# Patient Record
Sex: FEMALE | Race: WHITE | NOT HISPANIC OR LATINO
[De-identification: names, ages, dates, MRNs, and addresses within clinical notes are randomized per-mention and may not be internally consistent; named-entity substitution may affect disease eponyms.]

---

## 2020-05-04 PROBLEM — Z00.00 ENCOUNTER FOR PREVENTIVE HEALTH EXAMINATION: Status: ACTIVE | Noted: 2020-05-04

## 2020-05-11 ENCOUNTER — APPOINTMENT (OUTPATIENT)
Dept: OBGYN | Facility: CLINIC | Age: 30
End: 2020-05-11
Payer: COMMERCIAL

## 2020-05-11 VITALS — WEIGHT: 134 LBS | SYSTOLIC BLOOD PRESSURE: 110 MMHG | DIASTOLIC BLOOD PRESSURE: 80 MMHG

## 2020-05-11 DIAGNOSIS — Z87.19 PERSONAL HISTORY OF OTHER DISEASES OF THE DIGESTIVE SYSTEM: ICD-10-CM

## 2020-05-11 PROCEDURE — 36415 COLL VENOUS BLD VENIPUNCTURE: CPT

## 2020-05-11 NOTE — HISTORY OF PRESENT ILLNESS
[Normal Amount/Duration] : was of a normal amount and duration [Definite:  ___ (Date)] : the last menstrual period was [unfilled] [Regular Cycle Intervals] : periods have been regular [Menstrual Cramps] : menstrual cramps [Frequency: Q ___ days] : menstrual periods occur approximately every [unfilled] days [Menarche Age: ____] : age at menarche was [unfilled] [Spotting Between  Menses] : no spotting between menses [On BCP at conception] : the patient was not on BCP at conception [Contraception] : does not use contraception

## 2020-05-11 NOTE — CHIEF COMPLAINT
[Initial Prenatal Visit] : initial prenatal visit [FreeTextEntry1] : Initial prenatal visit.\par IVF\par Transfer Date:3/35/2020\par SHRUTHI:12/11/2020\par [id number]

## 2020-05-13 LAB
ABO + RH PNL BLD: NORMAL
BLD GP AB SCN SERPL QL: NORMAL
HCG SERPL-MCNC: ABNORMAL MIU/ML
PROGEST SERPL-MCNC: 26 NG/ML

## 2020-05-26 ENCOUNTER — TRANSCRIPTION ENCOUNTER (OUTPATIENT)
Age: 30
End: 2020-05-26

## 2020-05-28 ENCOUNTER — APPOINTMENT (OUTPATIENT)
Dept: OBGYN | Facility: CLINIC | Age: 30
End: 2020-05-28
Payer: COMMERCIAL

## 2020-05-28 PROCEDURE — 99212 OFFICE O/P EST SF 10 MIN: CPT

## 2020-08-31 ENCOUNTER — APPOINTMENT (OUTPATIENT)
Dept: OBGYN | Facility: CLINIC | Age: 30
End: 2020-08-31
Payer: COMMERCIAL

## 2020-08-31 VITALS
DIASTOLIC BLOOD PRESSURE: 80 MMHG | BODY MASS INDEX: 22.02 KG/M2 | WEIGHT: 137 LBS | SYSTOLIC BLOOD PRESSURE: 110 MMHG | HEIGHT: 66.14 IN

## 2020-08-31 DIAGNOSIS — O02.1 MISSED ABORTION: ICD-10-CM

## 2020-08-31 PROCEDURE — 0500F INITIAL PRENATAL CARE VISIT: CPT

## 2020-08-31 PROCEDURE — 36415 COLL VENOUS BLD VENIPUNCTURE: CPT

## 2020-09-01 LAB
ABO + RH PNL BLD: NORMAL
BASOPHILS # BLD AUTO: 0.04 K/UL
BASOPHILS NFR BLD AUTO: 0.4 %
BLD GP AB SCN SERPL QL: NORMAL
CMV IGG SERPL QL: >10 U/ML
CMV IGG SERPL-IMP: POSITIVE
CMV IGM SERPL QL: <8 AU/ML
CMV IGM SERPL QL: NEGATIVE
EOSINOPHIL # BLD AUTO: 0.24 K/UL
EOSINOPHIL NFR BLD AUTO: 2.4 %
HBV SURFACE AG SER QL: NONREACTIVE
HCT VFR BLD CALC: 43.3 %
HCV AB SER QL: NONREACTIVE
HCV S/CO RATIO: 0.26 S/CO
HGB BLD-MCNC: 14.1 G/DL
HIV1+2 AB SPEC QL IA.RAPID: NONREACTIVE
HSV 1+2 IGG SER IA-IMP: NEGATIVE
HSV 1+2 IGG SER IA-IMP: POSITIVE
HSV1 IGG SER QL: 0.15 INDEX
HSV2 IGG SER QL: 3.18 INDEX
IMM GRANULOCYTES NFR BLD AUTO: 0.6 %
LYMPHOCYTES # BLD AUTO: 2.51 K/UL
LYMPHOCYTES NFR BLD AUTO: 24.9 %
MAN DIFF?: NORMAL
MCHC RBC-ENTMCNC: 29.6 PG
MCHC RBC-ENTMCNC: 32.6 GM/DL
MCV RBC AUTO: 91 FL
MEV IGG FLD QL IA: 52.1 AU/ML
MEV IGG+IGM SER-IMP: POSITIVE
MONOCYTES # BLD AUTO: 0.58 K/UL
MONOCYTES NFR BLD AUTO: 5.7 %
NEUTROPHILS # BLD AUTO: 6.67 K/UL
NEUTROPHILS NFR BLD AUTO: 66 %
PLATELET # BLD AUTO: 204 K/UL
RBC # BLD: 4.76 M/UL
RBC # FLD: 12.4 %
RUBV IGG FLD-ACNC: 7.3 INDEX
RUBV IGG SER-IMP: POSITIVE
T GONDII AB SER-IMP: NEGATIVE
T GONDII AB SER-IMP: NEGATIVE
T GONDII IGG SER QL: 4.4 IU/ML
T GONDII IGM SER QL: <3 AU/ML
T PALLIDUM AB SER QL IA: NEGATIVE
TSH SERPL-ACNC: 0.29 UIU/ML
VZV AB TITR SER: POSITIVE
VZV IGG SER IF-ACNC: 2185 INDEX
WBC # FLD AUTO: 10.1 K/UL

## 2020-09-02 LAB
BACTERIA UR CULT: NORMAL
C TRACH RRNA SPEC QL NAA+PROBE: NOT DETECTED
N GONORRHOEA RRNA SPEC QL NAA+PROBE: NOT DETECTED
SOURCE AMPLIFICATION: NORMAL

## 2020-09-03 LAB
B19V IGG SER QL IA: 5.3 INDEX
B19V IGG+IGM SER-IMP: NORMAL
B19V IGG+IGM SER-IMP: POSITIVE
B19V IGM FLD-ACNC: 0.1
B19V IGM SER-ACNC: NEGATIVE
HGB A MFR BLD: 97.5 %
HGB A2 MFR BLD: 2.5 %
HGB FRACT BLD-IMP: NORMAL
HSV1 IGM SER QL: NORMAL TITER
HSV2 AB FLD-ACNC: NORMAL TITER
MEV IGM SER QL: NEGATIVE
VZV IGM SER IF-ACNC: <0.91 INDEX

## 2020-09-21 ENCOUNTER — APPOINTMENT (OUTPATIENT)
Dept: OBGYN | Facility: CLINIC | Age: 30
End: 2020-09-21
Payer: COMMERCIAL

## 2020-09-21 VITALS
SYSTOLIC BLOOD PRESSURE: 100 MMHG | BODY MASS INDEX: 26.7 KG/M2 | DIASTOLIC BLOOD PRESSURE: 60 MMHG | HEIGHT: 60 IN | WEIGHT: 136 LBS

## 2020-09-21 PROCEDURE — 0502F SUBSEQUENT PRENATAL CARE: CPT

## 2020-09-21 PROCEDURE — 36415 COLL VENOUS BLD VENIPUNCTURE: CPT

## 2020-10-05 ENCOUNTER — APPOINTMENT (OUTPATIENT)
Dept: ANTEPARTUM | Facility: CLINIC | Age: 30
End: 2020-10-05
Payer: COMMERCIAL

## 2020-10-05 PROCEDURE — 76801 OB US < 14 WKS SINGLE FETUS: CPT

## 2020-10-05 PROCEDURE — 76813 OB US NUCHAL MEAS 1 GEST: CPT

## 2020-10-15 ENCOUNTER — APPOINTMENT (OUTPATIENT)
Dept: OBGYN | Facility: CLINIC | Age: 30
End: 2020-10-15
Payer: COMMERCIAL

## 2020-10-15 VITALS
WEIGHT: 143.63 LBS | SYSTOLIC BLOOD PRESSURE: 100 MMHG | HEIGHT: 60 IN | BODY MASS INDEX: 28.2 KG/M2 | DIASTOLIC BLOOD PRESSURE: 60 MMHG

## 2020-10-15 PROCEDURE — 0502F SUBSEQUENT PRENATAL CARE: CPT

## 2020-11-02 ENCOUNTER — APPOINTMENT (OUTPATIENT)
Dept: ANTEPARTUM | Facility: CLINIC | Age: 30
End: 2020-11-02
Payer: COMMERCIAL

## 2020-11-02 PROCEDURE — 76805 OB US >/= 14 WKS SNGL FETUS: CPT

## 2020-11-02 PROCEDURE — 76817 TRANSVAGINAL US OBSTETRIC: CPT | Mod: 59

## 2020-11-02 PROCEDURE — 99072 ADDL SUPL MATRL&STAF TM PHE: CPT

## 2020-11-05 ENCOUNTER — APPOINTMENT (OUTPATIENT)
Dept: OBGYN | Facility: CLINIC | Age: 30
End: 2020-11-05
Payer: COMMERCIAL

## 2020-11-05 VITALS
SYSTOLIC BLOOD PRESSURE: 100 MMHG | HEIGHT: 60 IN | DIASTOLIC BLOOD PRESSURE: 60 MMHG | WEIGHT: 143 LBS | BODY MASS INDEX: 28.07 KG/M2

## 2020-11-05 PROCEDURE — 0502F SUBSEQUENT PRENATAL CARE: CPT

## 2020-11-09 LAB
1ST TRIMESTER DATA: NORMAL
2ND TRIMESTER DATA: NORMAL
AFP PNL SERPL: NORMAL
AFP SERPL-ACNC: NORMAL
AFP SERPL-ACNC: NORMAL
B-HCG FREE SERPL-MCNC: NORMAL
CLINICAL BIOCHEMIST REVIEW: NORMAL
FREE BETA HCG 1ST TRIMESTER: NORMAL
INHIBIN A SERPL-MCNC: NORMAL
NASAL BONE: PRESENT
NOTES NTD: NORMAL
NT: NORMAL
PAPP-A SERPL-ACNC: NORMAL
U ESTRIOL SERPL-SCNC: NORMAL

## 2020-12-03 ENCOUNTER — APPOINTMENT (OUTPATIENT)
Dept: ANTEPARTUM | Facility: CLINIC | Age: 30
End: 2020-12-03
Payer: COMMERCIAL

## 2020-12-03 PROCEDURE — 99072 ADDL SUPL MATRL&STAF TM PHE: CPT

## 2020-12-03 PROCEDURE — 76805 OB US >/= 14 WKS SNGL FETUS: CPT

## 2020-12-03 PROCEDURE — 76817 TRANSVAGINAL US OBSTETRIC: CPT | Mod: 59

## 2020-12-07 ENCOUNTER — APPOINTMENT (OUTPATIENT)
Dept: ANTEPARTUM | Facility: CLINIC | Age: 30
End: 2020-12-07
Payer: COMMERCIAL

## 2020-12-07 PROCEDURE — 76816 OB US FOLLOW-UP PER FETUS: CPT

## 2020-12-07 PROCEDURE — 99072 ADDL SUPL MATRL&STAF TM PHE: CPT

## 2020-12-08 ENCOUNTER — APPOINTMENT (OUTPATIENT)
Dept: OBGYN | Facility: CLINIC | Age: 30
End: 2020-12-08
Payer: COMMERCIAL

## 2020-12-08 VITALS
DIASTOLIC BLOOD PRESSURE: 60 MMHG | SYSTOLIC BLOOD PRESSURE: 100 MMHG | BODY MASS INDEX: 29.79 KG/M2 | WEIGHT: 151.75 LBS | HEIGHT: 60 IN

## 2020-12-08 PROCEDURE — 0502F SUBSEQUENT PRENATAL CARE: CPT

## 2020-12-29 ENCOUNTER — APPOINTMENT (OUTPATIENT)
Dept: OBGYN | Facility: CLINIC | Age: 30
End: 2020-12-29
Payer: COMMERCIAL

## 2020-12-29 VITALS
WEIGHT: 154 LBS | HEIGHT: 60 IN | BODY MASS INDEX: 30.23 KG/M2 | DIASTOLIC BLOOD PRESSURE: 70 MMHG | SYSTOLIC BLOOD PRESSURE: 120 MMHG

## 2020-12-29 PROCEDURE — 36415 COLL VENOUS BLD VENIPUNCTURE: CPT

## 2020-12-29 PROCEDURE — 0502F SUBSEQUENT PRENATAL CARE: CPT

## 2020-12-30 LAB
BASOPHILS # BLD AUTO: 0.02 K/UL
BASOPHILS NFR BLD AUTO: 0.2 %
EOSINOPHIL # BLD AUTO: 0.04 K/UL
EOSINOPHIL NFR BLD AUTO: 0.4 %
ESTIMATED AVERAGE GLUCOSE: 88 MG/DL
GLUCOSE 1H P 50 G GLC PO SERPL-MCNC: 145 MG/DL
HBA1C MFR BLD HPLC: 4.7 %
HCT VFR BLD CALC: 37.5 %
HGB BLD-MCNC: 12 G/DL
IMM GRANULOCYTES NFR BLD AUTO: 2.7 %
LYMPHOCYTES # BLD AUTO: 1.91 K/UL
LYMPHOCYTES NFR BLD AUTO: 20.4 %
MAN DIFF?: NORMAL
MCHC RBC-ENTMCNC: 30.9 PG
MCHC RBC-ENTMCNC: 32 GM/DL
MCV RBC AUTO: 96.6 FL
MONOCYTES # BLD AUTO: 0.52 K/UL
MONOCYTES NFR BLD AUTO: 5.6 %
NEUTROPHILS # BLD AUTO: 6.62 K/UL
NEUTROPHILS NFR BLD AUTO: 70.7 %
PLATELET # BLD AUTO: 156 K/UL
RBC # BLD: 3.88 M/UL
RBC # FLD: 13.9 %
WBC # FLD AUTO: 9.36 K/UL

## 2020-12-31 LAB — BACTERIA UR CULT: NORMAL

## 2021-01-04 ENCOUNTER — TRANSCRIPTION ENCOUNTER (OUTPATIENT)
Age: 31
End: 2021-01-04

## 2021-01-05 ENCOUNTER — TRANSCRIPTION ENCOUNTER (OUTPATIENT)
Age: 31
End: 2021-01-05

## 2021-01-06 ENCOUNTER — APPOINTMENT (OUTPATIENT)
Dept: OBGYN | Facility: CLINIC | Age: 31
End: 2021-01-06
Payer: COMMERCIAL

## 2021-01-06 ENCOUNTER — NON-APPOINTMENT (OUTPATIENT)
Age: 31
End: 2021-01-06

## 2021-01-06 PROCEDURE — 0502F SUBSEQUENT PRENATAL CARE: CPT

## 2021-01-06 PROCEDURE — 36415 COLL VENOUS BLD VENIPUNCTURE: CPT

## 2021-01-07 LAB
GLUCOSE 1H P 100 G GLC PO SERPL-MCNC: 165 MG/DL
GLUCOSE 2H P CHAL SERPL-MCNC: 152 MG/DL
GLUCOSE 3H P CHAL SERPL-MCNC: 126 MG/DL
GLUCOSE BS SERPL-MCNC: 77 MG/DL

## 2021-01-21 ENCOUNTER — APPOINTMENT (OUTPATIENT)
Dept: ANTEPARTUM | Facility: CLINIC | Age: 31
End: 2021-01-21
Payer: COMMERCIAL

## 2021-01-21 PROCEDURE — 76817 TRANSVAGINAL US OBSTETRIC: CPT | Mod: 59

## 2021-01-21 PROCEDURE — 99072 ADDL SUPL MATRL&STAF TM PHE: CPT

## 2021-01-21 PROCEDURE — 76819 FETAL BIOPHYS PROFIL W/O NST: CPT

## 2021-01-21 PROCEDURE — 76816 OB US FOLLOW-UP PER FETUS: CPT

## 2021-01-28 ENCOUNTER — APPOINTMENT (OUTPATIENT)
Dept: OBGYN | Facility: CLINIC | Age: 31
End: 2021-01-28
Payer: COMMERCIAL

## 2021-01-28 ENCOUNTER — NON-APPOINTMENT (OUTPATIENT)
Age: 31
End: 2021-01-28

## 2021-01-28 ENCOUNTER — APPOINTMENT (OUTPATIENT)
Dept: OBGYN | Facility: CLINIC | Age: 31
End: 2021-01-28

## 2021-01-28 VITALS — BODY MASS INDEX: 30.66 KG/M2 | SYSTOLIC BLOOD PRESSURE: 100 MMHG | DIASTOLIC BLOOD PRESSURE: 70 MMHG | WEIGHT: 157 LBS

## 2021-01-28 PROCEDURE — 0502F SUBSEQUENT PRENATAL CARE: CPT

## 2021-02-10 ENCOUNTER — TRANSCRIPTION ENCOUNTER (OUTPATIENT)
Age: 31
End: 2021-02-10

## 2021-02-18 ENCOUNTER — ASOB RESULT (OUTPATIENT)
Age: 31
End: 2021-02-18

## 2021-02-18 ENCOUNTER — APPOINTMENT (OUTPATIENT)
Dept: ANTEPARTUM | Facility: CLINIC | Age: 31
End: 2021-02-18
Payer: COMMERCIAL

## 2021-02-18 PROCEDURE — 76816 OB US FOLLOW-UP PER FETUS: CPT

## 2021-02-18 PROCEDURE — 76819 FETAL BIOPHYS PROFIL W/O NST: CPT

## 2021-02-18 PROCEDURE — 99072 ADDL SUPL MATRL&STAF TM PHE: CPT

## 2021-02-25 ENCOUNTER — NON-APPOINTMENT (OUTPATIENT)
Age: 31
End: 2021-02-25

## 2021-02-25 ENCOUNTER — APPOINTMENT (OUTPATIENT)
Dept: OBGYN | Facility: CLINIC | Age: 31
End: 2021-02-25
Payer: COMMERCIAL

## 2021-02-25 VITALS
BODY MASS INDEX: 31.61 KG/M2 | WEIGHT: 161 LBS | SYSTOLIC BLOOD PRESSURE: 100 MMHG | DIASTOLIC BLOOD PRESSURE: 60 MMHG | HEIGHT: 60 IN

## 2021-02-25 PROCEDURE — 0502F SUBSEQUENT PRENATAL CARE: CPT

## 2021-03-12 ENCOUNTER — NON-APPOINTMENT (OUTPATIENT)
Age: 31
End: 2021-03-12

## 2021-03-12 ENCOUNTER — APPOINTMENT (OUTPATIENT)
Dept: OBGYN | Facility: CLINIC | Age: 31
End: 2021-03-12
Payer: COMMERCIAL

## 2021-03-12 VITALS — WEIGHT: 165 LBS | BODY MASS INDEX: 32.22 KG/M2 | DIASTOLIC BLOOD PRESSURE: 68 MMHG | SYSTOLIC BLOOD PRESSURE: 106 MMHG

## 2021-03-12 PROCEDURE — 0502F SUBSEQUENT PRENATAL CARE: CPT

## 2021-03-15 LAB
GP B STREP DNA SPEC QL NAA+PROBE: NORMAL
GP B STREP DNA SPEC QL NAA+PROBE: NOT DETECTED
SOURCE GBS: NORMAL

## 2021-03-16 RX ORDER — VALACYCLOVIR 500 MG/1
500 TABLET, FILM COATED ORAL
Qty: 60 | Refills: 1 | Status: ACTIVE | COMMUNITY
Start: 2021-03-16 | End: 1900-01-01

## 2021-03-18 ENCOUNTER — NON-APPOINTMENT (OUTPATIENT)
Age: 31
End: 2021-03-18

## 2021-03-18 ENCOUNTER — APPOINTMENT (OUTPATIENT)
Dept: ANTEPARTUM | Facility: CLINIC | Age: 31
End: 2021-03-18
Payer: COMMERCIAL

## 2021-03-18 ENCOUNTER — ASOB RESULT (OUTPATIENT)
Age: 31
End: 2021-03-18

## 2021-03-18 ENCOUNTER — APPOINTMENT (OUTPATIENT)
Dept: OBGYN | Facility: CLINIC | Age: 31
End: 2021-03-18
Payer: COMMERCIAL

## 2021-03-18 VITALS — BODY MASS INDEX: 32.81 KG/M2 | SYSTOLIC BLOOD PRESSURE: 110 MMHG | WEIGHT: 168 LBS | DIASTOLIC BLOOD PRESSURE: 80 MMHG

## 2021-03-18 PROCEDURE — 76819 FETAL BIOPHYS PROFIL W/O NST: CPT

## 2021-03-18 PROCEDURE — 0502F SUBSEQUENT PRENATAL CARE: CPT

## 2021-03-18 PROCEDURE — 99072 ADDL SUPL MATRL&STAF TM PHE: CPT

## 2021-03-18 PROCEDURE — 76816 OB US FOLLOW-UP PER FETUS: CPT

## 2021-03-18 RX ORDER — VALACYCLOVIR 500 MG/1
500 TABLET, FILM COATED ORAL
Qty: 60 | Refills: 2 | Status: ACTIVE | COMMUNITY
Start: 2021-03-18 | End: 1900-01-01

## 2021-03-26 ENCOUNTER — NON-APPOINTMENT (OUTPATIENT)
Age: 31
End: 2021-03-26

## 2021-03-26 ENCOUNTER — APPOINTMENT (OUTPATIENT)
Dept: OBGYN | Facility: CLINIC | Age: 31
End: 2021-03-26
Payer: COMMERCIAL

## 2021-03-26 VITALS — WEIGHT: 170 LBS | DIASTOLIC BLOOD PRESSURE: 60 MMHG | BODY MASS INDEX: 33.2 KG/M2 | SYSTOLIC BLOOD PRESSURE: 90 MMHG

## 2021-03-26 PROCEDURE — 0502F SUBSEQUENT PRENATAL CARE: CPT

## 2021-03-29 ENCOUNTER — NON-APPOINTMENT (OUTPATIENT)
Age: 31
End: 2021-03-29

## 2021-03-29 ENCOUNTER — APPOINTMENT (OUTPATIENT)
Dept: OBGYN | Facility: CLINIC | Age: 31
End: 2021-03-29
Payer: COMMERCIAL

## 2021-03-29 VITALS
WEIGHT: 170 LBS | HEIGHT: 60 IN | DIASTOLIC BLOOD PRESSURE: 60 MMHG | BODY MASS INDEX: 33.38 KG/M2 | SYSTOLIC BLOOD PRESSURE: 100 MMHG

## 2021-03-29 VITALS — HEIGHT: 60 IN

## 2021-03-29 PROCEDURE — 0502F SUBSEQUENT PRENATAL CARE: CPT

## 2021-03-29 RX ORDER — URSODIOL 500 MG/1
500 TABLET ORAL TWICE DAILY
Qty: 60 | Refills: 3 | Status: ACTIVE | COMMUNITY
Start: 2021-03-29 | End: 1900-01-01

## 2021-03-30 ENCOUNTER — INPATIENT (INPATIENT)
Facility: HOSPITAL | Age: 31
LOS: 1 days | Discharge: HOME | End: 2021-04-01
Attending: OBSTETRICS & GYNECOLOGY | Admitting: OBSTETRICS & GYNECOLOGY
Payer: COMMERCIAL

## 2021-03-30 VITALS — HEIGHT: 66 IN | WEIGHT: 169.09 LBS

## 2021-03-30 DIAGNOSIS — O26.899 OTHER SPECIFIED PREGNANCY RELATED CONDITIONS, UNSPECIFIED TRIMESTER: ICD-10-CM

## 2021-03-30 DIAGNOSIS — Z3A.00 WEEKS OF GESTATION OF PREGNANCY NOT SPECIFIED: ICD-10-CM

## 2021-03-30 LAB
ALBUMIN SERPL ELPH-MCNC: 3.6 G/DL
ALBUMIN SERPL ELPH-MCNC: 3.6 G/DL — SIGNIFICANT CHANGE UP (ref 3.3–5)
ALP BLD-CCNC: 152 U/L
ALP SERPL-CCNC: 146 U/L — HIGH (ref 40–120)
ALT FLD-CCNC: 16 U/L — SIGNIFICANT CHANGE UP (ref 10–45)
ALT SERPL-CCNC: 15 U/L
ANION GAP SERPL CALC-SCNC: 12 MMOL/L — SIGNIFICANT CHANGE UP (ref 5–17)
ANION GAP SERPL CALC-SCNC: 15 MMOL/L
ANISOCYTOSIS BLD QL: SLIGHT — SIGNIFICANT CHANGE UP
APPEARANCE: ABNORMAL
APTT BLD: 26.6 SEC — LOW (ref 27.5–35.5)
AST SERPL-CCNC: 23 U/L
AST SERPL-CCNC: 24 U/L — SIGNIFICANT CHANGE UP (ref 10–40)
BACTERIA: ABNORMAL
BASOPHILS # BLD AUTO: 0 K/UL — SIGNIFICANT CHANGE UP (ref 0–0.2)
BASOPHILS # BLD AUTO: 0.05 K/UL
BASOPHILS NFR BLD AUTO: 0 % — SIGNIFICANT CHANGE UP (ref 0–2)
BASOPHILS NFR BLD AUTO: 0.6 %
BILIRUB SERPL-MCNC: 0.2 MG/DL
BILIRUB SERPL-MCNC: 0.3 MG/DL — SIGNIFICANT CHANGE UP (ref 0.2–1.2)
BILIRUBIN URINE: NEGATIVE
BLD GP AB SCN SERPL QL: NEGATIVE — SIGNIFICANT CHANGE UP
BLOOD URINE: NEGATIVE
BUN SERPL-MCNC: 6 MG/DL
BUN SERPL-MCNC: 6 MG/DL — LOW (ref 7–23)
CALCIUM SERPL-MCNC: 9.3 MG/DL — SIGNIFICANT CHANGE UP (ref 8.4–10.5)
CALCIUM SERPL-MCNC: 9.4 MG/DL
CHLORIDE SERPL-SCNC: 101 MMOL/L — SIGNIFICANT CHANGE UP (ref 96–108)
CHLORIDE SERPL-SCNC: 105 MMOL/L
CO2 SERPL-SCNC: 20 MMOL/L
CO2 SERPL-SCNC: 23 MMOL/L — SIGNIFICANT CHANGE UP (ref 22–31)
COLOR: NORMAL
CREAT ?TM UR-MCNC: 103 MG/DL — SIGNIFICANT CHANGE UP
CREAT SERPL-MCNC: 0.58 MG/DL
CREAT SERPL-MCNC: 0.6 MG/DL — SIGNIFICANT CHANGE UP (ref 0.5–1.3)
CREAT SPEC-SCNC: 43 MG/DL
CREAT/PROT UR: 0.2 RATIO
DACRYOCYTES BLD QL SMEAR: SLIGHT — SIGNIFICANT CHANGE UP
EOSINOPHIL # BLD AUTO: 0 K/UL — SIGNIFICANT CHANGE UP (ref 0–0.5)
EOSINOPHIL # BLD AUTO: 0.03 K/UL
EOSINOPHIL NFR BLD AUTO: 0 % — SIGNIFICANT CHANGE UP (ref 0–6)
EOSINOPHIL NFR BLD AUTO: 0.3 %
FIBRINOGEN PPP-MCNC: 450 MG/DL — HIGH (ref 258–438)
GIANT PLATELETS BLD QL SMEAR: PRESENT — SIGNIFICANT CHANGE UP
GLUCOSE QUALITATIVE U: NEGATIVE
GLUCOSE SERPL-MCNC: 100 MG/DL
GLUCOSE SERPL-MCNC: 80 MG/DL — SIGNIFICANT CHANGE UP (ref 70–99)
HCT VFR BLD CALC: 37.2 % — SIGNIFICANT CHANGE UP (ref 34.5–45)
HCT VFR BLD CALC: 40.2 %
HGB BLD-MCNC: 12.4 G/DL — SIGNIFICANT CHANGE UP (ref 11.5–15.5)
HGB BLD-MCNC: 12.8 G/DL
HYALINE CASTS: 0 /LPF
IMM GRANULOCYTES NFR BLD AUTO: 2 %
INR BLD: 1.04 — SIGNIFICANT CHANGE UP (ref 0.88–1.16)
KETONES URINE: NEGATIVE
LDH SERPL L TO P-CCNC: 235 U/L — SIGNIFICANT CHANGE UP (ref 50–242)
LEUKOCYTE ESTERASE URINE: ABNORMAL
LYMPHOCYTES # BLD AUTO: 0.85 K/UL — LOW (ref 1–3.3)
LYMPHOCYTES # BLD AUTO: 1.6 K/UL
LYMPHOCYTES # BLD AUTO: 9.7 % — LOW (ref 13–44)
LYMPHOCYTES NFR BLD AUTO: 18.1 %
MACROCYTES BLD QL: SLIGHT — SIGNIFICANT CHANGE UP
MAN DIFF?: NORMAL
MANUAL SMEAR VERIFICATION: SIGNIFICANT CHANGE UP
MCHC RBC-ENTMCNC: 30.5 PG — SIGNIFICANT CHANGE UP (ref 27–34)
MCHC RBC-ENTMCNC: 31.3 PG
MCHC RBC-ENTMCNC: 31.8 GM/DL
MCHC RBC-ENTMCNC: 33.3 GM/DL — SIGNIFICANT CHANGE UP (ref 32–36)
MCV RBC AUTO: 91.6 FL — SIGNIFICANT CHANGE UP (ref 80–100)
MCV RBC AUTO: 98.3 FL
MICROCYTES BLD QL: SLIGHT — SIGNIFICANT CHANGE UP
MICROSCOPIC-UA: NORMAL
MONOCYTES # BLD AUTO: 0.47 K/UL — SIGNIFICANT CHANGE UP (ref 0–0.9)
MONOCYTES # BLD AUTO: 0.64 K/UL
MONOCYTES NFR BLD AUTO: 5.3 % — SIGNIFICANT CHANGE UP (ref 2–14)
MONOCYTES NFR BLD AUTO: 7.2 %
MYELOCYTES NFR BLD: 2.7 % — HIGH (ref 0–0)
NEUTROPHILS # BLD AUTO: 6.36 K/UL
NEUTROPHILS # BLD AUTO: 7.23 K/UL — SIGNIFICANT CHANGE UP (ref 1.8–7.4)
NEUTROPHILS NFR BLD AUTO: 71.8 %
NEUTROPHILS NFR BLD AUTO: 82.3 % — HIGH (ref 43–77)
NITRITE URINE: NEGATIVE
OVALOCYTES BLD QL SMEAR: SLIGHT — SIGNIFICANT CHANGE UP
PH URINE: 7
PLAT MORPH BLD: ABNORMAL
PLATELET # BLD AUTO: 145 K/UL
PLATELET # BLD AUTO: 151 K/UL — SIGNIFICANT CHANGE UP (ref 150–400)
POIKILOCYTOSIS BLD QL AUTO: SLIGHT — SIGNIFICANT CHANGE UP
POTASSIUM SERPL-MCNC: 4 MMOL/L — SIGNIFICANT CHANGE UP (ref 3.5–5.3)
POTASSIUM SERPL-SCNC: 4 MMOL/L — SIGNIFICANT CHANGE UP (ref 3.5–5.3)
POTASSIUM SERPL-SCNC: 4.2 MMOL/L
PROT ?TM UR-MCNC: 23 MG/DL — HIGH (ref 0–12)
PROT SERPL-MCNC: 6 G/DL
PROT SERPL-MCNC: 6.7 G/DL — SIGNIFICANT CHANGE UP (ref 6–8.3)
PROT UR-MCNC: 9 MG/DL
PROT/CREAT UR-RTO: 0.2 RATIO — SIGNIFICANT CHANGE UP (ref 0–0.2)
PROTEIN URINE: NEGATIVE
PROTHROM AB SERPL-ACNC: 12.4 SEC — SIGNIFICANT CHANGE UP (ref 10.6–13.6)
RBC # BLD: 4.06 M/UL — SIGNIFICANT CHANGE UP (ref 3.8–5.2)
RBC # BLD: 4.09 M/UL
RBC # FLD: 14.2 % — SIGNIFICANT CHANGE UP (ref 10.3–14.5)
RBC # FLD: 14.9 %
RBC BLD AUTO: ABNORMAL
RED BLOOD CELLS URINE: 1 /HPF
RH IG SCN BLD-IMP: POSITIVE — SIGNIFICANT CHANGE UP
RH IG SCN BLD-IMP: POSITIVE — SIGNIFICANT CHANGE UP
SARS-COV-2 RNA SPEC QL NAA+PROBE: SIGNIFICANT CHANGE UP
SODIUM SERPL-SCNC: 136 MMOL/L — SIGNIFICANT CHANGE UP (ref 135–145)
SODIUM SERPL-SCNC: 140 MMOL/L
SPECIFIC GRAVITY URINE: 1.01
SPHEROCYTES BLD QL SMEAR: SLIGHT — SIGNIFICANT CHANGE UP
SQUAMOUS EPITHELIAL CELLS: 15 /HPF
URATE SERPL-MCNC: 4.7 MG/DL — SIGNIFICANT CHANGE UP (ref 2.5–7)
URATE SERPL-MCNC: 4.9 MG/DL
UROBILINOGEN URINE: NORMAL
WBC # BLD: 8.78 K/UL — SIGNIFICANT CHANGE UP (ref 3.8–10.5)
WBC # FLD AUTO: 8.78 K/UL — SIGNIFICANT CHANGE UP (ref 3.8–10.5)
WBC # FLD AUTO: 8.86 K/UL
WHITE BLOOD CELLS URINE: 20 /HPF

## 2021-03-30 RX ORDER — OXYTOCIN 10 UNIT/ML
2 VIAL (ML) INJECTION
Qty: 30 | Refills: 0 | Status: DISCONTINUED | OUTPATIENT
Start: 2021-03-30 | End: 2021-03-31

## 2021-03-30 RX ORDER — CITRIC ACID/SODIUM CITRATE 300-500 MG
15 SOLUTION, ORAL ORAL ONCE
Refills: 0 | Status: DISCONTINUED | OUTPATIENT
Start: 2021-03-30 | End: 2021-03-31

## 2021-03-30 RX ORDER — OXYTOCIN 10 UNIT/ML
333.33 VIAL (ML) INJECTION
Qty: 20 | Refills: 0 | Status: DISCONTINUED | OUTPATIENT
Start: 2021-03-30 | End: 2021-03-31

## 2021-03-30 RX ORDER — SODIUM CHLORIDE 9 MG/ML
1000 INJECTION, SOLUTION INTRAVENOUS
Refills: 0 | Status: DISCONTINUED | OUTPATIENT
Start: 2021-03-30 | End: 2021-03-31

## 2021-03-30 RX ORDER — VALACYCLOVIR 500 MG/1
1 TABLET, FILM COATED ORAL
Qty: 0 | Refills: 0 | DISCHARGE

## 2021-03-30 RX ORDER — URSODIOL 250 MG/1
1 TABLET, FILM COATED ORAL
Qty: 0 | Refills: 0 | DISCHARGE

## 2021-03-30 RX ADMIN — Medication 2 MILLIUNIT(S)/MIN: at 13:36

## 2021-03-30 RX ADMIN — SODIUM CHLORIDE 125 MILLILITER(S): 9 INJECTION, SOLUTION INTRAVENOUS at 12:53

## 2021-03-31 ENCOUNTER — RESULT REVIEW (OUTPATIENT)
Age: 31
End: 2021-03-31

## 2021-03-31 LAB
COVID-19 SPIKE DOMAIN AB INTERP: POSITIVE
COVID-19 SPIKE DOMAIN ANTIBODY RESULT: >250 U/ML — HIGH
HCT VFR BLD CALC: 27.2 % — LOW (ref 34.5–45)
HGB BLD-MCNC: 9.2 G/DL — LOW (ref 11.5–15.5)
MCHC RBC-ENTMCNC: 30.8 PG — SIGNIFICANT CHANGE UP (ref 27–34)
MCHC RBC-ENTMCNC: 33.8 GM/DL — SIGNIFICANT CHANGE UP (ref 32–36)
MCV RBC AUTO: 91 FL — SIGNIFICANT CHANGE UP (ref 80–100)
NRBC # BLD: 0 /100 WBCS — SIGNIFICANT CHANGE UP (ref 0–0)
PLATELET # BLD AUTO: 125 K/UL — LOW (ref 150–400)
RBC # BLD: 2.99 M/UL — LOW (ref 3.8–5.2)
RBC # FLD: 14.6 % — HIGH (ref 10.3–14.5)
SARS-COV-2 IGG+IGM SERPL QL IA: >250 U/ML — HIGH
SARS-COV-2 IGG+IGM SERPL QL IA: POSITIVE
T PALLIDUM AB TITR SER: NEGATIVE — SIGNIFICANT CHANGE UP
WBC # BLD: 15.65 K/UL — HIGH (ref 3.8–10.5)
WBC # FLD AUTO: 15.65 K/UL — HIGH (ref 3.8–10.5)

## 2021-03-31 PROCEDURE — 88307 TISSUE EXAM BY PATHOLOGIST: CPT | Mod: 26

## 2021-03-31 PROCEDURE — 59400 OBSTETRICAL CARE: CPT

## 2021-03-31 RX ORDER — AER TRAVELER 0.5 G/1
1 SOLUTION RECTAL; TOPICAL EVERY 4 HOURS
Refills: 0 | Status: DISCONTINUED | OUTPATIENT
Start: 2021-03-31 | End: 2021-04-01

## 2021-03-31 RX ORDER — IBUPROFEN 200 MG
600 TABLET ORAL EVERY 6 HOURS
Refills: 0 | Status: DISCONTINUED | OUTPATIENT
Start: 2021-03-31 | End: 2021-04-01

## 2021-03-31 RX ORDER — FENTANYL/BUPIVACAINE/NS/PF 2MCG/ML-.1
250 PLASTIC BAG, INJECTION (ML) INJECTION
Refills: 0 | Status: DISCONTINUED | OUTPATIENT
Start: 2021-03-31 | End: 2021-03-31

## 2021-03-31 RX ORDER — BENZOCAINE 10 %
1 GEL (GRAM) MUCOUS MEMBRANE EVERY 6 HOURS
Refills: 0 | Status: DISCONTINUED | OUTPATIENT
Start: 2021-03-31 | End: 2021-04-01

## 2021-03-31 RX ORDER — SODIUM CHLORIDE 9 MG/ML
3 INJECTION INTRAMUSCULAR; INTRAVENOUS; SUBCUTANEOUS EVERY 8 HOURS
Refills: 0 | Status: DISCONTINUED | OUTPATIENT
Start: 2021-03-31 | End: 2021-04-01

## 2021-03-31 RX ORDER — DIBUCAINE 1 %
1 OINTMENT (GRAM) RECTAL EVERY 6 HOURS
Refills: 0 | Status: DISCONTINUED | OUTPATIENT
Start: 2021-03-31 | End: 2021-04-01

## 2021-03-31 RX ORDER — KETOROLAC TROMETHAMINE 30 MG/ML
30 SYRINGE (ML) INJECTION ONCE
Refills: 0 | Status: DISCONTINUED | OUTPATIENT
Start: 2021-03-31 | End: 2021-03-31

## 2021-03-31 RX ORDER — SIMETHICONE 80 MG/1
80 TABLET, CHEWABLE ORAL EVERY 4 HOURS
Refills: 0 | Status: DISCONTINUED | OUTPATIENT
Start: 2021-03-31 | End: 2021-04-01

## 2021-03-31 RX ORDER — IBUPROFEN 200 MG
600 TABLET ORAL EVERY 6 HOURS
Refills: 0 | Status: COMPLETED | OUTPATIENT
Start: 2021-03-31 | End: 2022-02-27

## 2021-03-31 RX ORDER — SODIUM CHLORIDE 9 MG/ML
500 INJECTION, SOLUTION INTRAVENOUS ONCE
Refills: 0 | Status: COMPLETED | OUTPATIENT
Start: 2021-03-31 | End: 2021-03-31

## 2021-03-31 RX ORDER — HYDROCORTISONE 1 %
1 OINTMENT (GRAM) TOPICAL EVERY 6 HOURS
Refills: 0 | Status: DISCONTINUED | OUTPATIENT
Start: 2021-03-31 | End: 2021-04-01

## 2021-03-31 RX ORDER — OXYCODONE HYDROCHLORIDE 5 MG/1
5 TABLET ORAL ONCE
Refills: 0 | Status: DISCONTINUED | OUTPATIENT
Start: 2021-03-31 | End: 2021-04-01

## 2021-03-31 RX ORDER — OXYTOCIN 10 UNIT/ML
333.33 VIAL (ML) INJECTION
Qty: 20 | Refills: 0 | Status: DISCONTINUED | OUTPATIENT
Start: 2021-03-31 | End: 2021-04-01

## 2021-03-31 RX ORDER — MAGNESIUM HYDROXIDE 400 MG/1
30 TABLET, CHEWABLE ORAL
Refills: 0 | Status: DISCONTINUED | OUTPATIENT
Start: 2021-03-31 | End: 2021-04-01

## 2021-03-31 RX ORDER — TETANUS TOXOID, REDUCED DIPHTHERIA TOXOID AND ACELLULAR PERTUSSIS VACCINE, ADSORBED 5; 2.5; 8; 8; 2.5 [IU]/.5ML; [IU]/.5ML; UG/.5ML; UG/.5ML; UG/.5ML
0.5 SUSPENSION INTRAMUSCULAR ONCE
Refills: 0 | Status: DISCONTINUED | OUTPATIENT
Start: 2021-03-31 | End: 2021-04-01

## 2021-03-31 RX ORDER — LANOLIN
1 OINTMENT (GRAM) TOPICAL EVERY 6 HOURS
Refills: 0 | Status: DISCONTINUED | OUTPATIENT
Start: 2021-03-31 | End: 2021-04-01

## 2021-03-31 RX ORDER — PRAMOXINE HYDROCHLORIDE 150 MG/15G
1 AEROSOL, FOAM RECTAL EVERY 4 HOURS
Refills: 0 | Status: DISCONTINUED | OUTPATIENT
Start: 2021-03-31 | End: 2021-04-01

## 2021-03-31 RX ORDER — DIPHENHYDRAMINE HCL 50 MG
25 CAPSULE ORAL EVERY 6 HOURS
Refills: 0 | Status: DISCONTINUED | OUTPATIENT
Start: 2021-03-31 | End: 2021-04-01

## 2021-03-31 RX ORDER — ACETAMINOPHEN 500 MG
975 TABLET ORAL
Refills: 0 | Status: DISCONTINUED | OUTPATIENT
Start: 2021-03-31 | End: 2021-04-01

## 2021-03-31 RX ORDER — OXYCODONE HYDROCHLORIDE 5 MG/1
5 TABLET ORAL
Refills: 0 | Status: DISCONTINUED | OUTPATIENT
Start: 2021-03-31 | End: 2021-04-01

## 2021-03-31 RX ADMIN — Medication 30 MILLIGRAM(S): at 07:30

## 2021-03-31 RX ADMIN — Medication 600 MILLIGRAM(S): at 19:02

## 2021-03-31 RX ADMIN — Medication 1 SPRAY(S): at 13:46

## 2021-03-31 RX ADMIN — Medication 975 MILLIGRAM(S): at 22:20

## 2021-03-31 RX ADMIN — Medication 975 MILLIGRAM(S): at 11:09

## 2021-03-31 RX ADMIN — Medication 975 MILLIGRAM(S): at 21:31

## 2021-03-31 RX ADMIN — Medication 30 MILLIGRAM(S): at 08:44

## 2021-03-31 RX ADMIN — Medication 1 APPLICATION(S): at 13:46

## 2021-03-31 RX ADMIN — SODIUM CHLORIDE 3 MILLILITER(S): 9 INJECTION INTRAMUSCULAR; INTRAVENOUS; SUBCUTANEOUS at 13:47

## 2021-03-31 RX ADMIN — Medication 975 MILLIGRAM(S): at 16:56

## 2021-03-31 RX ADMIN — Medication 975 MILLIGRAM(S): at 10:35

## 2021-03-31 RX ADMIN — Medication 975 MILLIGRAM(S): at 16:33

## 2021-03-31 RX ADMIN — Medication 600 MILLIGRAM(S): at 14:21

## 2021-03-31 RX ADMIN — Medication 600 MILLIGRAM(S): at 18:47

## 2021-03-31 RX ADMIN — SODIUM CHLORIDE 3 MILLILITER(S): 9 INJECTION INTRAMUSCULAR; INTRAVENOUS; SUBCUTANEOUS at 21:31

## 2021-03-31 RX ADMIN — Medication 1 TABLET(S): at 13:47

## 2021-03-31 RX ADMIN — SODIUM CHLORIDE 1000 MILLILITER(S): 9 INJECTION, SOLUTION INTRAVENOUS at 15:23

## 2021-03-31 RX ADMIN — Medication 600 MILLIGRAM(S): at 13:46

## 2021-03-31 RX ADMIN — Medication 1000 MILLIUNIT(S)/MIN: at 05:22

## 2021-03-31 NOTE — CHART NOTE - NSCHARTNOTEFT_GEN_A_CORE
called to bedside by nurse for vasovagal episode after voiding.   Pt evaluated at bedside, reports starting to feel dizzy, vision went dark, and her ears began to ring. was assisted to the bathroom by her nurse. she did not fall and denies losing consciousness. called to bedside by nurse for vasovagal episode after voiding.   Pt evaluated at bedside, reports she started to feel dizzy, vision went dark, and her ears began to ring after void. she was assisted to the bathroom by her nurse. she did not fall and denies losing consciousness.  dizziness has since resolved.     Vital Signs Last 24 Hrs  T(C): 36.4 (31 Mar 2021 13:54), Max: 37.1 (31 Mar 2021 05:35)  T(F): 97.6 (31 Mar 2021 13:54), Max: 98.8 (31 Mar 2021 05:35)  HR: 83 (31 Mar 2021 14:20) (78 - 124)  BP: 95/58 (31 Mar 2021 14:20) (90/58 - 123/99)  BP(mean): --  ABP: --  ABP(mean): --  RR: 18 (31 Mar 2021 14:20) (18 - 18)  SpO2: 97% (31 Mar 2021 14:20) (94% - 100%)    CBC  -  ( 31 Mar 2021 08:35 )  WBC Count : 15.65 K/uL  RBC Count : 2.99 M/uL  Hemoglobin : 9.2 g/dL  Hematocrit : 27.2 %  Platelet Count - Automated : 125 K/uL  Mean Cell Volume : 91.0 fl  Mean Cell Hemoglobin : 30.8 pg  Mean Cell Hemoglobin Concentration : 33.8 gm/dL    P/E:   95/58     HR83  Gen: NAD, pt was found in bed asleep   Abd: soft, appropriately tender, fundus firm called to bedside by nurse for vasovagal episode after voiding.   Pt evaluated at bedside, reports she started to feel dizzy, vision went dark, and her ears began to ring suddenly after voiding. she was assisted to the bathroom by her nurse. she did not fall and denies losing consciousness. dizziness has since resolved. denies heavy VB.  soaking less than 1 pad per hour. now resting in bed denies further symptoms of anemia: vision changes, CP, SOB, palpitations.     Vital Signs Last 24 Hrs  T(C): 36.4 (31 Mar 2021 13:54), Max: 37.1 (31 Mar 2021 05:35)  T(F): 97.6 (31 Mar 2021 13:54), Max: 98.8 (31 Mar 2021 05:35)  HR: 83 (31 Mar 2021 14:20) (78 - 124)  BP: 95/58 (31 Mar 2021 14:20) (90/58 - 123/99)  BP(mean): --  ABP: --  ABP(mean): --  RR: 18 (31 Mar 2021 14:20) (18 - 18)  SpO2: 97% (31 Mar 2021 14:20) (94% - 100%)    CBC  -  ( 31 Mar 2021 08:35 )  WBC Count : 15.65 K/uL  RBC Count : 2.99 M/uL  Hemoglobin : 9.2 g/dL  Hematocrit : 27.2 %  Platelet Count - Automated : 125 K/uL  Mean Cell Volume : 91.0 fl  Mean Cell Hemoglobin : 30.8 pg  Mean Cell Hemoglobin Concentration : 33.8 gm/dL    P/E:   95/58     HR83  Gen: NAD, pt was found in bed asleep   RR: no increased work of breathing   Abd: soft, appropriately tender, fundus firm and below the umbilicus  Ext: no TTP      A/P: 32yo  PPD0 reports vasovagal episode. postpartum hemoglobin 9.2 (ante hemoglobin 12)  -500cc bolus fluids called to bedside by nurse for vasovagal episode after voiding.   Pt evaluated at bedside, reports she started to feel dizzy, vision went dark, and her ears began to ring suddenly after voiding. she was assisted to the bathroom by her nurse. she did not fall and denies losing consciousness. dizziness has since resolved. denies heavy VB.  soaking less than 1 pad per hour. now resting in bed denies further symptoms of anemia: vision changes, CP, SOB, palpitations.     Vital Signs Last 24 Hrs  T(C): 36.4 (31 Mar 2021 13:54), Max: 37.1 (31 Mar 2021 05:35)  T(F): 97.6 (31 Mar 2021 13:54), Max: 98.8 (31 Mar 2021 05:35)  HR: 83 (31 Mar 2021 14:20) (78 - 124)  BP: 95/58 (31 Mar 2021 14:20) (90/58 - 123/99)  BP(mean): --  ABP: --  ABP(mean): --  RR: 18 (31 Mar 2021 14:20) (18 - 18)  SpO2: 97% (31 Mar 2021 14:20) (94% - 100%)    CBC  -  ( 31 Mar 2021 08:35 )  WBC Count : 15.65 K/uL  RBC Count : 2.99 M/uL  Hemoglobin : 9.2 g/dL  Hematocrit : 27.2 %  Platelet Count - Automated : 125 K/uL  Mean Cell Volume : 91.0 fl  Mean Cell Hemoglobin : 30.8 pg  Mean Cell Hemoglobin Concentration : 33.8 gm/dL    P/E:   95/58     HR83  Gen: NAD, pt was found in bed asleep   RR: no increased work of breathing   Abd: soft, appropriately tender, fundus firm and below the umbilicus  Ext: no TTP      A/P: 30yo  PPD0 s/p vasovagal episode. postpartum hemoglobin 9.2 (ante hemoglobin 12).   currently stable. no other symptoms of anemia.   -if symptoms recure plan for repeat CBC  -IVF: 500cc bolus ordered

## 2021-04-01 ENCOUNTER — TRANSCRIPTION ENCOUNTER (OUTPATIENT)
Age: 31
End: 2021-04-01

## 2021-04-01 ENCOUNTER — APPOINTMENT (OUTPATIENT)
Dept: OBGYN | Facility: CLINIC | Age: 31
End: 2021-04-01

## 2021-04-01 VITALS
TEMPERATURE: 98 F | SYSTOLIC BLOOD PRESSURE: 105 MMHG | HEART RATE: 73 BPM | RESPIRATION RATE: 18 BRPM | OXYGEN SATURATION: 96 % | DIASTOLIC BLOOD PRESSURE: 72 MMHG

## 2021-04-01 LAB — SURGICAL PATHOLOGY STUDY: SIGNIFICANT CHANGE UP

## 2021-04-01 PROCEDURE — 84550 ASSAY OF BLOOD/URIC ACID: CPT

## 2021-04-01 PROCEDURE — 86900 BLOOD TYPING SEROLOGIC ABO: CPT

## 2021-04-01 PROCEDURE — 88307 TISSUE EXAM BY PATHOLOGIST: CPT

## 2021-04-01 PROCEDURE — 85025 COMPLETE CBC W/AUTO DIFF WBC: CPT

## 2021-04-01 PROCEDURE — 82570 ASSAY OF URINE CREATININE: CPT

## 2021-04-01 PROCEDURE — 86901 BLOOD TYPING SEROLOGIC RH(D): CPT

## 2021-04-01 PROCEDURE — 99214 OFFICE O/P EST MOD 30 MIN: CPT

## 2021-04-01 PROCEDURE — U0005: CPT

## 2021-04-01 PROCEDURE — 86780 TREPONEMA PALLIDUM: CPT

## 2021-04-01 PROCEDURE — 85730 THROMBOPLASTIN TIME PARTIAL: CPT

## 2021-04-01 PROCEDURE — 86769 SARS-COV-2 COVID-19 ANTIBODY: CPT

## 2021-04-01 PROCEDURE — 59050 FETAL MONITOR W/REPORT: CPT

## 2021-04-01 PROCEDURE — 83615 LACTATE (LD) (LDH) ENZYME: CPT

## 2021-04-01 PROCEDURE — 85384 FIBRINOGEN ACTIVITY: CPT

## 2021-04-01 PROCEDURE — 85610 PROTHROMBIN TIME: CPT

## 2021-04-01 PROCEDURE — 86850 RBC ANTIBODY SCREEN: CPT

## 2021-04-01 PROCEDURE — 80053 COMPREHEN METABOLIC PANEL: CPT

## 2021-04-01 PROCEDURE — 84156 ASSAY OF PROTEIN URINE: CPT

## 2021-04-01 PROCEDURE — 36415 COLL VENOUS BLD VENIPUNCTURE: CPT

## 2021-04-01 PROCEDURE — U0003: CPT

## 2021-04-01 PROCEDURE — 85027 COMPLETE CBC AUTOMATED: CPT

## 2021-04-01 RX ORDER — IBUPROFEN 200 MG
1 TABLET ORAL
Qty: 0 | Refills: 0 | DISCHARGE
Start: 2021-04-01

## 2021-04-01 RX ORDER — ACETAMINOPHEN 500 MG
3 TABLET ORAL
Qty: 0 | Refills: 0 | DISCHARGE
Start: 2021-04-01

## 2021-04-01 RX ORDER — BENZOCAINE 10 %
1 GEL (GRAM) MUCOUS MEMBRANE
Qty: 0 | Refills: 0 | DISCHARGE
Start: 2021-04-01

## 2021-04-01 RX ADMIN — Medication 1 TABLET(S): at 12:17

## 2021-04-01 RX ADMIN — Medication 600 MILLIGRAM(S): at 13:00

## 2021-04-01 RX ADMIN — Medication 600 MILLIGRAM(S): at 00:33

## 2021-04-01 RX ADMIN — Medication 975 MILLIGRAM(S): at 05:00

## 2021-04-01 RX ADMIN — Medication 975 MILLIGRAM(S): at 04:20

## 2021-04-01 RX ADMIN — Medication 600 MILLIGRAM(S): at 07:10

## 2021-04-01 RX ADMIN — Medication 600 MILLIGRAM(S): at 06:26

## 2021-04-01 RX ADMIN — Medication 600 MILLIGRAM(S): at 01:15

## 2021-04-01 RX ADMIN — Medication 600 MILLIGRAM(S): at 12:17

## 2021-04-01 RX ADMIN — SODIUM CHLORIDE 3 MILLILITER(S): 9 INJECTION INTRAMUSCULAR; INTRAVENOUS; SUBCUTANEOUS at 06:26

## 2021-04-01 RX ADMIN — Medication 975 MILLIGRAM(S): at 10:46

## 2021-04-01 NOTE — DISCHARGE NOTE OB - MEDICATION SUMMARY - MEDICATIONS TO STOP TAKING
I will STOP taking the medications listed below when I get home from the hospital:    ursodiol 250 mg oral capsule  -- 1 tab(s) by mouth once a day    valACYclovir 500 mg oral tablet  -- 1 tab(s) by mouth once a day

## 2021-04-01 NOTE — PROGRESS NOTE ADULT - SUBJECTIVE AND OBJECTIVE BOX
Patient evaluated at bedside.  No acute events overnight.    She reports pain is well controlled with medications.     She has been ambulating without assistance and is voiding spontaneously. Reports decrease in vaginal bleeding and denies clots.     She denies HA, dizziness, chest pain, palpitations, shortness of breath, n/v, heavy vaginal bleeding or perineal discomfort.    Physical Exam:  Vital Signs Last 24 Hrs  T(C): 36.7 (31 Mar 2021 22:00), Max: 36.7 (31 Mar 2021 18:00)  T(F): 98 (31 Mar 2021 22:00), Max: 98 (31 Mar 2021 18:00)  HR: 88 (31 Mar 2021 22:00) (79 - 110)  BP: 94/59 (31 Mar 2021 22:00) (90/58 - 109/73)  BP(mean): --  RR: 18 (31 Mar 2021 22:00) (18 - 18)  SpO2: 97% (31 Mar 2021 22:00) (94% - 100%)    GA: NAD, A+0 x 3  Abd: + BS, soft, appropriately tender, nondistended, no rebound or guarding, uterus firm at midline  : lochia WNL  Extremities: no edema or calf tenderness                          9.2    15.65 )-----------( 125      ( 31 Mar 2021 08:35 )             27.2     03-30    136  |  101  |  6<L>  ----------------------------<  80  4.0   |  23  |  0.60    Ca    9.3      30 Mar 2021 13:26    TPro  6.7  /  Alb  3.6  /  TBili  0.3  /  DBili  x   /  AST  24  /  ALT  16  /  AlkPhos  146<H>  03-30    PT/INR - ( 30 Mar 2021 13:26 )   PT: 12.4 sec;   INR: 1.04          PTT - ( 30 Mar 2021 13:26 )  PTT:26.6 sec

## 2021-04-01 NOTE — DISCHARGE NOTE OB - CARE PROVIDER_API CALL
David Us)  Obstetrics and Gynecology  [address], [facility], [place] [address]  Phone: [phone]  Fax: [phone]  Follow Up Time:

## 2021-04-01 NOTE — PROGRESS NOTE ADULT - SUBJECTIVE AND OBJECTIVE BOX
Patient evaluated at bedside.   She reports pain is well controlled with NSAID's.  She denies headache, dizziness, chest pain, palpitations, shortness of breath, nausea, vomiting, heavy vaginal bleeding or perineal discomfort.  She has been ambulating without assistance, voiding spontaneously, and is breastfeeding.  Tolerating regular diet    Physical Exam:  VSS:     GA: NAD, A+0 x 3  CV: RRR  Pulm: CTAB  Breasts: soft, nontender, no palpable masses  Abd: + BS, soft, nontender, nondistended, no rebound or guarding, uterus firm at midline,  2 fb below umbilicus  : lochia WNL; Perineum: ice packs in place.  Mild swelling  Extremities: trace BL LE swelling w/o calf tenderness, reflexes +2 bilaterally                            9.2    15.65 )-----------( 125      ( 31 Mar 2021 08:35 )             27.2           Assessment:   PPD #  0 S/P     Plan:  Supportive Care  Diet as tolerated  Oral pain Meds

## 2021-04-01 NOTE — DISCHARGE NOTE OB - PATIENT PORTAL LINK FT
You can access the FollowMyHealth Patient Portal offered by [facility] by registering at the following website: [email or web address]. By joining [facility]’s FollowMyHealth portal, you will also be able to view your health information using other applications (apps) compatible with our system.

## 2021-04-01 NOTE — DISCHARGE NOTE OB - MEDICATION SUMMARY - MEDICATIONS TO TAKE
I will START or STAY ON the medications listed below when I get home from the hospital:    acetaminophen 325 mg oral tablet  -- 3 tab(s) by mouth   -- Indication: For Pain    ibuprofen 600 mg oral tablet  -- 1 tab(s) by mouth every 6 hours  -- Indication: For Pain     benzocaine 20% topical spray  -- 1 spray(s) on skin every 6 hours, As needed, for Perineal discomfort  -- Indication: For Perineal pain

## 2021-04-05 LAB
BILEACIDS T: 4.3 UMOL/L
CHENDEOXYCHOLIC ACID: 1.5 UMOL/L
CHOLIC ACID: 0.9 UMOL/L
DEOXYCHOLIC ACID: 1.9 UMOL/L
URSODEOXYCH: <0.1 UMOL/L

## 2021-04-06 DIAGNOSIS — Z3A.38 38 WEEKS GESTATION OF PREGNANCY: ICD-10-CM

## 2021-04-06 DIAGNOSIS — Z34.83 ENCOUNTER FOR SUPERVISION OF OTHER NORMAL PREGNANCY, THIRD TRIMESTER: ICD-10-CM

## 2021-04-06 DIAGNOSIS — R55 SYNCOPE AND COLLAPSE: ICD-10-CM

## 2021-04-06 DIAGNOSIS — L29.8 OTHER PRURITUS: ICD-10-CM

## 2021-04-06 DIAGNOSIS — K83.1 OBSTRUCTION OF BILE DUCT: ICD-10-CM

## 2021-04-06 DIAGNOSIS — A60.09 HERPESVIRAL INFECTION OF OTHER UROGENITAL TRACT: ICD-10-CM

## 2021-04-06 DIAGNOSIS — Z90.13 ACQUIRED ABSENCE OF BILATERAL BREASTS AND NIPPLES: ICD-10-CM

## 2021-04-06 DIAGNOSIS — R21 RASH AND OTHER NONSPECIFIC SKIN ERUPTION: ICD-10-CM

## 2021-04-08 ENCOUNTER — APPOINTMENT (OUTPATIENT)
Dept: OBGYN | Facility: CLINIC | Age: 31
End: 2021-04-08

## 2021-05-13 ENCOUNTER — NON-APPOINTMENT (OUTPATIENT)
Age: 31
End: 2021-05-13

## 2021-05-13 ENCOUNTER — APPOINTMENT (OUTPATIENT)
Dept: OBGYN | Facility: CLINIC | Age: 31
End: 2021-05-13
Payer: COMMERCIAL

## 2021-05-13 VITALS — WEIGHT: 150 LBS | DIASTOLIC BLOOD PRESSURE: 60 MMHG | SYSTOLIC BLOOD PRESSURE: 100 MMHG | BODY MASS INDEX: 29.3 KG/M2

## 2021-05-13 DIAGNOSIS — Z31.83 ENCOUNTER FOR ASSISTED REPRODUCTIVE FERTILITY PROCEDURE CYCLE: ICD-10-CM

## 2021-05-13 PROCEDURE — 0503F POSTPARTUM CARE VISIT: CPT

## 2021-05-13 RX ORDER — ETONOGESTREL AND ETHINYL ESTRADIOL 11.7; 2.7 MG/1; MG/1
0.12-0.015 INSERT, EXTENDED RELEASE VAGINAL
Qty: 3 | Refills: 3 | Status: ACTIVE | COMMUNITY
Start: 2021-05-13 | End: 1900-01-01

## 2021-05-13 NOTE — HISTORY OF PRESENT ILLNESS
[Postpartum Follow Up] : postpartum follow up [Delivery Date: ___] : on [unfilled] [Female] : Delivery History: baby girl [Complications:___] : no complications [] : delivered by vaginal delivery [Breastfeeding] : not currently nursing [S/Sx PP Depression] : no signs/symptoms of postpartum depression [Erythema] : not erythematous [Back to Normal] : is back to normal in size [Mild] : mild vaginal bleeding [Normal] : the vagina was normal [Cervix Sample Taken] : cervical sample not taken for a Pap smear [Not Done] : Examination of breasts not done [Doing Well] : is doing well [No Sign of Infection] : is showing no signs of infection [Excellent Pain Control] : has excellent pain control [None] : None [FreeTextEntry8] : Follow up six weeks  post partum exam.

## 2021-05-19 LAB — CYTOLOGY CVX/VAG DOC THIN PREP: NORMAL

## 2021-06-14 ENCOUNTER — RX RENEWAL (OUTPATIENT)
Age: 31
End: 2021-06-14

## 2021-06-14 RX ORDER — CHOLECALCIFEROL (VITAMIN D3) 25 MCG
27-0.8-228 TABLET,CHEWABLE ORAL
Qty: 90 | Refills: 2 | Status: ACTIVE | COMMUNITY
Start: 2020-09-21 | End: 1900-01-01

## 2021-06-18 ENCOUNTER — NON-APPOINTMENT (OUTPATIENT)
Age: 31
End: 2021-06-18

## 2022-02-14 ENCOUNTER — APPOINTMENT (OUTPATIENT)
Dept: OBGYN | Facility: CLINIC | Age: 32
End: 2022-02-14
Payer: COMMERCIAL

## 2022-02-14 VITALS
OXYGEN SATURATION: 99 % | DIASTOLIC BLOOD PRESSURE: 67 MMHG | HEIGHT: 60 IN | WEIGHT: 127 LBS | SYSTOLIC BLOOD PRESSURE: 105 MMHG | BODY MASS INDEX: 24.94 KG/M2 | HEART RATE: 70 BPM

## 2022-02-14 DIAGNOSIS — Z01.419 ENCOUNTER FOR GYNECOLOGICAL EXAMINATION (GENERAL) (ROUTINE) W/OUT ABNORMAL FINDINGS: ICD-10-CM

## 2022-02-14 PROCEDURE — 99395 PREV VISIT EST AGE 18-39: CPT

## 2022-02-14 RX ORDER — CLOTRIMAZOLE AND BETAMETHASONE DIPROPIONATE 10; .5 MG/G; MG/G
1-0.05 CREAM TOPICAL TWICE DAILY
Qty: 1 | Refills: 2 | Status: ACTIVE | COMMUNITY
Start: 2022-02-14 | End: 1900-01-01

## 2022-02-14 NOTE — HISTORY OF PRESENT ILLNESS
[Patient reported PAP Smear was normal] : Patient reported PAP Smear was normal [Normal Amount/Duration] :  normal amount and duration [Regular Cycle Intervals] : periods have been regular [Frequency: Q ___ days] : menstrual periods occur approximately every [unfilled] days [Menstrual Cramps] : menstrual cramps [Currently Active] : currently active [Men] : men [No] : No [PapSmeardate] : 5/13/2021 [FreeTextEntry1] : 1/20/2022

## 2022-02-19 LAB — CYTOLOGY CVX/VAG DOC THIN PREP: NORMAL

## 2022-11-22 ENCOUNTER — APPOINTMENT (OUTPATIENT)
Dept: OBGYN | Facility: CLINIC | Age: 32
End: 2022-11-22

## 2022-11-22 PROCEDURE — 36415 COLL VENOUS BLD VENIPUNCTURE: CPT

## 2022-11-23 LAB
HCG SERPL-MCNC: 2500 MIU/ML
PROGEST SERPL-MCNC: 12.5 NG/ML

## 2022-11-28 ENCOUNTER — NON-APPOINTMENT (OUTPATIENT)
Age: 32
End: 2022-11-28

## 2022-11-29 ENCOUNTER — APPOINTMENT (OUTPATIENT)
Dept: OBGYN | Facility: CLINIC | Age: 32
End: 2022-11-29

## 2022-11-29 PROCEDURE — 36415 COLL VENOUS BLD VENIPUNCTURE: CPT

## 2022-11-30 ENCOUNTER — APPOINTMENT (OUTPATIENT)
Dept: OBGYN | Facility: CLINIC | Age: 32
End: 2022-11-30

## 2022-11-30 VITALS
OXYGEN SATURATION: 99 % | BODY MASS INDEX: 19.77 KG/M2 | HEIGHT: 66.14 IN | SYSTOLIC BLOOD PRESSURE: 105 MMHG | WEIGHT: 123 LBS | HEART RATE: 79 BPM | DIASTOLIC BLOOD PRESSURE: 71 MMHG

## 2022-11-30 LAB
HCG SERPL-MCNC: ABNORMAL MIU/ML
PROGEST SERPL-MCNC: 13.2 NG/ML

## 2022-11-30 PROCEDURE — 76817 TRANSVAGINAL US OBSTETRIC: CPT

## 2022-11-30 PROCEDURE — 99214 OFFICE O/P EST MOD 30 MIN: CPT | Mod: 25

## 2022-11-30 NOTE — HISTORY OF PRESENT ILLNESS
[FreeTextEntry1] : LMP: 10/18/2022\par SHRUTHI: 2023\par GA: 6w1d\par \par 33 yo  at 6w1d by LMP alone who presents with missed menses.\par \par Pt reports today had an episode of bleeding and passed clots. Filled a pad. Feels cramping with bleeding but has now stopped. 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 Second pregnancy with IVF. \par HCG trended from 2500 to 63694.

## 2022-11-30 NOTE — PHYSICAL EXAM
[Chaperone Present] : A chaperone was present in the examining room during all aspects of the physical examination [Appropriately responsive] : appropriately responsive [Alert] : alert [No Acute Distress] : no acute distress [Regular Rate Rhythm] : regular rate rhythm [No Murmurs] : no murmurs [Clear to Auscultation B/L] : clear to auscultation bilaterally [Soft] : soft [Non-tender] : non-tender [Non-distended] : non-distended [No Mass] : no mass [Oriented x3] : oriented x3 [Labia Majora] : normal [Labia Minora] : normal [Scant] : There was scant vaginal bleeding [Normal] : normal [Uterine Adnexae] : normal [FreeTextEntry5] : closed

## 2022-11-30 NOTE — PROCEDURE
[Transvaginal OB Sonogram] : Transvaginal OB Sonogram [Intrauterine Pregnancy] : intrauterine pregnancy [Yolk Sac] : no yolk sac [Fetal Heart] : no fetal heart [Current GA by Sonogram: ___ (wks)] : Current GA by Sonogram: [unfilled]Uwks [___ day(s)] : [unfilled] days [Transvaginal OB Sonogram WNL] : Transvaginal OB Sonogram WNL

## 2022-11-30 NOTE — PLAN
[FreeTextEntry1] : 33 yo  at 6w1d by LMP alone who presents with missed menses.\par \par - GS seen, no YS or FHTs\par - GS not yet meeting diagnostic criteria for EPL; GS measuring 1.8 cm\par - Rh positive no Rhogam indicated\par - Highly desired pregnancy, recommend f/u in 1 week; can check HCG today\par - Discussed early IUP vs early miscarriage\par \par RTO in 1 week with Dr. Us.\par \par Kimmie Keane MD\par

## 2022-12-01 ENCOUNTER — TRANSCRIPTION ENCOUNTER (OUTPATIENT)
Age: 32
End: 2022-12-01

## 2022-12-01 LAB — HCG SERPL-MCNC: ABNORMAL MIU/ML

## 2022-12-08 ENCOUNTER — APPOINTMENT (OUTPATIENT)
Dept: OBGYN | Facility: CLINIC | Age: 32
End: 2022-12-08

## 2022-12-08 VITALS
SYSTOLIC BLOOD PRESSURE: 108 MMHG | OXYGEN SATURATION: 100 % | BODY MASS INDEX: 20.11 KG/M2 | HEART RATE: 80 BPM | DIASTOLIC BLOOD PRESSURE: 72 MMHG | WEIGHT: 125.1 LBS

## 2022-12-08 PROCEDURE — 36415 COLL VENOUS BLD VENIPUNCTURE: CPT

## 2022-12-12 LAB
HCG SERPL-MCNC: ABNORMAL MIU/ML
PROGEST SERPL-MCNC: 11.3 NG/ML

## 2022-12-15 ENCOUNTER — APPOINTMENT (OUTPATIENT)
Dept: OBGYN | Facility: CLINIC | Age: 32
End: 2022-12-15

## 2022-12-15 VITALS
OXYGEN SATURATION: 99 % | DIASTOLIC BLOOD PRESSURE: 74 MMHG | WEIGHT: 124 LBS | SYSTOLIC BLOOD PRESSURE: 104 MMHG | BODY MASS INDEX: 19.93 KG/M2 | HEART RATE: 67 BPM

## 2022-12-15 PROCEDURE — 0500F INITIAL PRENATAL CARE VISIT: CPT

## 2022-12-19 LAB
ABO + RH PNL BLD: NORMAL
B19V IGG SER QL IA: 4.74 INDEX
B19V IGG+IGM SER-IMP: NORMAL
B19V IGG+IGM SER-IMP: POSITIVE
B19V IGM FLD-ACNC: 0.16 INDEX
B19V IGM SER-ACNC: NEGATIVE
BACTERIA UR CULT: NORMAL
BASOPHILS # BLD AUTO: 0.04 K/UL
BASOPHILS NFR BLD AUTO: 0.5 %
BLD GP AB SCN SERPL QL: NORMAL
C TRACH RRNA SPEC QL NAA+PROBE: NOT DETECTED
CMV IGG SERPL QL: >10 U/ML
CMV IGG SERPL-IMP: POSITIVE
CMV IGM SERPL QL: <8 AU/ML
CMV IGM SERPL QL: NEGATIVE
EOSINOPHIL # BLD AUTO: 0 K/UL
EOSINOPHIL NFR BLD AUTO: 0 %
HBV SURFACE AG SER QL: NONREACTIVE
HCT VFR BLD CALC: 37.1 %
HCV AB SER QL: NONREACTIVE
HCV S/CO RATIO: 0.14 S/CO
HGB A MFR BLD: 97.5 %
HGB A2 MFR BLD: 2.5 %
HGB BLD-MCNC: 11.9 G/DL
HGB FRACT BLD-IMP: NORMAL
HIV1+2 AB SPEC QL IA.RAPID: NONREACTIVE
HSV 1+2 IGG SER IA-IMP: NEGATIVE
HSV 1+2 IGG SER IA-IMP: POSITIVE
HSV1 IGG SER QL: 0.16 INDEX
HSV2 IGG SER QL: 1.44 INDEX
IMM GRANULOCYTES NFR BLD AUTO: 0.5 %
LYMPHOCYTES # BLD AUTO: 2.43 K/UL
LYMPHOCYTES NFR BLD AUTO: 31.7 %
MAN DIFF?: NORMAL
MCHC RBC-ENTMCNC: 29.2 PG
MCHC RBC-ENTMCNC: 32.1 GM/DL
MCV RBC AUTO: 90.9 FL
MEV IGG FLD QL IA: 41.6 AU/ML
MEV IGG+IGM SER-IMP: POSITIVE
MEV IGM SER QL: <0.91 ISR
MONOCYTES # BLD AUTO: 0.54 K/UL
MONOCYTES NFR BLD AUTO: 7 %
N GONORRHOEA RRNA SPEC QL NAA+PROBE: NOT DETECTED
NEUTROPHILS # BLD AUTO: 4.62 K/UL
NEUTROPHILS NFR BLD AUTO: 60.3 %
PLATELET # BLD AUTO: 202 K/UL
RBC # BLD: 4.08 M/UL
RBC # FLD: 13.3 %
RUBV IGG FLD-ACNC: 6.2 INDEX
RUBV IGG SER-IMP: POSITIVE
RUBV IGM FLD-ACNC: <20 AU/ML
SOURCE AMPLIFICATION: NORMAL
T GONDII AB SER-IMP: NEGATIVE
T GONDII AB SER-IMP: NEGATIVE
T GONDII IGG SER QL: 3.3 IU/ML
T GONDII IGM SER QL: <3 AU/ML
T PALLIDUM AB SER QL IA: NEGATIVE
TSH SERPL-ACNC: 0.82 UIU/ML
VZV AB TITR SER: POSITIVE
VZV IGG SER IF-ACNC: 1285 INDEX
VZV IGM SER IF-ACNC: <0.91 INDEX
WBC # FLD AUTO: 7.67 K/UL

## 2022-12-22 LAB
HSV1 IGM SER QL: NEGATIVE
HSV2 AB FLD-ACNC: NEGATIVE

## 2023-01-24 ENCOUNTER — APPOINTMENT (OUTPATIENT)
Dept: OBGYN | Facility: CLINIC | Age: 33
End: 2023-01-24
Payer: COMMERCIAL

## 2023-01-24 VITALS
SYSTOLIC BLOOD PRESSURE: 101 MMHG | BODY MASS INDEX: 20.09 KG/M2 | HEART RATE: 89 BPM | WEIGHT: 125 LBS | DIASTOLIC BLOOD PRESSURE: 69 MMHG | OXYGEN SATURATION: 97 %

## 2023-01-24 PROCEDURE — 0502F SUBSEQUENT PRENATAL CARE: CPT

## 2023-01-24 RX ORDER — CHOLECALCIFEROL (VITAMIN D3) 25 MCG
27-0.8-228 TABLET,CHEWABLE ORAL
Qty: 90 | Refills: 3 | Status: ACTIVE | COMMUNITY
Start: 2023-01-24 | End: 1900-01-01

## 2023-01-27 ENCOUNTER — NON-APPOINTMENT (OUTPATIENT)
Age: 33
End: 2023-01-27

## 2023-02-01 ENCOUNTER — NON-APPOINTMENT (OUTPATIENT)
Age: 33
End: 2023-02-01

## 2023-02-03 ENCOUNTER — APPOINTMENT (OUTPATIENT)
Dept: OBGYN | Facility: CLINIC | Age: 33
End: 2023-02-03
Payer: COMMERCIAL

## 2023-02-03 DIAGNOSIS — R10.2 OTHER SPECIFIED PREGNANCY RELATED CONDITIONS, UNSPECIFIED TRIMESTER: ICD-10-CM

## 2023-02-03 DIAGNOSIS — O26.899 OTHER SPECIFIED PREGNANCY RELATED CONDITIONS, UNSPECIFIED TRIMESTER: ICD-10-CM

## 2023-02-03 PROCEDURE — 99212 OFFICE O/P EST SF 10 MIN: CPT

## 2023-02-05 LAB
BACTERIA UR CULT: NORMAL
CANDIDA VAG CYTO: NOT DETECTED
G VAGINALIS+PREV SP MTYP VAG QL MICRO: NOT DETECTED
T VAGINALIS VAG QL WET PREP: NOT DETECTED

## 2023-02-14 ENCOUNTER — ASOB RESULT (OUTPATIENT)
Age: 33
End: 2023-02-14

## 2023-02-14 ENCOUNTER — APPOINTMENT (OUTPATIENT)
Dept: ANTEPARTUM | Facility: CLINIC | Age: 33
End: 2023-02-14
Payer: COMMERCIAL

## 2023-02-14 PROCEDURE — 76811 OB US DETAILED SNGL FETUS: CPT

## 2023-02-14 PROCEDURE — 76817 TRANSVAGINAL US OBSTETRIC: CPT | Mod: 59

## 2023-02-21 ENCOUNTER — APPOINTMENT (OUTPATIENT)
Dept: ANTEPARTUM | Facility: CLINIC | Age: 33
End: 2023-02-21
Payer: COMMERCIAL

## 2023-02-21 ENCOUNTER — ASOB RESULT (OUTPATIENT)
Age: 33
End: 2023-02-21

## 2023-02-21 PROCEDURE — 76946 ECHO GUIDE FOR AMNIOCENTESIS: CPT

## 2023-02-21 PROCEDURE — 59000 AMNIOCENTESIS DIAGNOSTIC: CPT

## 2023-02-28 ENCOUNTER — APPOINTMENT (OUTPATIENT)
Dept: OBGYN | Facility: CLINIC | Age: 33
End: 2023-02-28
Payer: COMMERCIAL

## 2023-02-28 VITALS
OXYGEN SATURATION: 99 % | HEART RATE: 86 BPM | SYSTOLIC BLOOD PRESSURE: 104 MMHG | DIASTOLIC BLOOD PRESSURE: 71 MMHG | BODY MASS INDEX: 20.57 KG/M2 | WEIGHT: 128 LBS

## 2023-02-28 PROCEDURE — 0502F SUBSEQUENT PRENATAL CARE: CPT

## 2023-03-16 ENCOUNTER — ASOB RESULT (OUTPATIENT)
Age: 33
End: 2023-03-16

## 2023-03-16 ENCOUNTER — APPOINTMENT (OUTPATIENT)
Dept: ANTEPARTUM | Facility: CLINIC | Age: 33
End: 2023-03-16
Payer: COMMERCIAL

## 2023-03-16 PROCEDURE — 76816 OB US FOLLOW-UP PER FETUS: CPT

## 2023-03-30 ENCOUNTER — APPOINTMENT (OUTPATIENT)
Dept: OBGYN | Facility: CLINIC | Age: 33
End: 2023-03-30
Payer: COMMERCIAL

## 2023-03-30 VITALS
SYSTOLIC BLOOD PRESSURE: 101 MMHG | DIASTOLIC BLOOD PRESSURE: 66 MMHG | BODY MASS INDEX: 21.86 KG/M2 | WEIGHT: 136 LBS | OXYGEN SATURATION: 97 %

## 2023-03-30 PROCEDURE — 0502F SUBSEQUENT PRENATAL CARE: CPT

## 2023-04-25 ENCOUNTER — MED ADMIN CHARGE (OUTPATIENT)
Age: 33
End: 2023-04-25

## 2023-04-25 ENCOUNTER — APPOINTMENT (OUTPATIENT)
Dept: OBGYN | Facility: CLINIC | Age: 33
End: 2023-04-25
Payer: COMMERCIAL

## 2023-04-25 VITALS
DIASTOLIC BLOOD PRESSURE: 69 MMHG | OXYGEN SATURATION: 98 % | WEIGHT: 139 LBS | BODY MASS INDEX: 22.34 KG/M2 | HEART RATE: 94 BPM | SYSTOLIC BLOOD PRESSURE: 103 MMHG

## 2023-04-25 DIAGNOSIS — Z23 ENCOUNTER FOR IMMUNIZATION: ICD-10-CM

## 2023-04-25 PROCEDURE — 90471 IMMUNIZATION ADMIN: CPT

## 2023-04-25 PROCEDURE — 0502F SUBSEQUENT PRENATAL CARE: CPT

## 2023-04-25 PROCEDURE — 90715 TDAP VACCINE 7 YRS/> IM: CPT

## 2023-04-26 LAB
BASOPHILS # BLD AUTO: 0.04 K/UL
BASOPHILS NFR BLD AUTO: 0.4 %
EOSINOPHIL # BLD AUTO: 0.45 K/UL
EOSINOPHIL NFR BLD AUTO: 4.3 %
ESTIMATED AVERAGE GLUCOSE: 97 MG/DL
HBA1C MFR BLD HPLC: 5 %
HCT VFR BLD CALC: 33.9 %
HGB BLD-MCNC: 10.4 G/DL
IMM GRANULOCYTES NFR BLD AUTO: 2 %
LYMPHOCYTES # BLD AUTO: 2.07 K/UL
LYMPHOCYTES NFR BLD AUTO: 19.8 %
MAN DIFF?: NORMAL
MCHC RBC-ENTMCNC: 30.6 PG
MCHC RBC-ENTMCNC: 30.7 GM/DL
MCV RBC AUTO: 99.7 FL
MONOCYTES # BLD AUTO: 0.6 K/UL
MONOCYTES NFR BLD AUTO: 5.7 %
NEUTROPHILS # BLD AUTO: 7.09 K/UL
NEUTROPHILS NFR BLD AUTO: 67.8 %
PLATELET # BLD AUTO: 144 K/UL
RBC # BLD: 3.4 M/UL
RBC # FLD: 14.1 %
TSH SERPL-ACNC: 1.24 UIU/ML
WBC # FLD AUTO: 10.46 K/UL

## 2023-05-02 LAB
BACTERIA UR CULT: NORMAL
GLUCOSE 1H P 50 G GLC PO SERPL-MCNC: 97 MG/DL

## 2023-05-18 ENCOUNTER — ASOB RESULT (OUTPATIENT)
Age: 33
End: 2023-05-18

## 2023-05-18 ENCOUNTER — APPOINTMENT (OUTPATIENT)
Dept: ANTEPARTUM | Facility: CLINIC | Age: 33
End: 2023-05-18
Payer: COMMERCIAL

## 2023-05-18 PROCEDURE — 76819 FETAL BIOPHYS PROFIL W/O NST: CPT

## 2023-05-18 PROCEDURE — 76816 OB US FOLLOW-UP PER FETUS: CPT

## 2023-05-22 ENCOUNTER — NON-APPOINTMENT (OUTPATIENT)
Age: 33
End: 2023-05-22

## 2023-05-22 ENCOUNTER — APPOINTMENT (OUTPATIENT)
Dept: OBGYN | Facility: CLINIC | Age: 33
End: 2023-05-22
Payer: COMMERCIAL

## 2023-05-22 VITALS
WEIGHT: 149 LBS | SYSTOLIC BLOOD PRESSURE: 106 MMHG | OXYGEN SATURATION: 96 % | BODY MASS INDEX: 23.95 KG/M2 | DIASTOLIC BLOOD PRESSURE: 70 MMHG | HEART RATE: 100 BPM

## 2023-05-22 PROCEDURE — 0502F SUBSEQUENT PRENATAL CARE: CPT

## 2023-05-22 RX ORDER — CHOLECALCIFEROL (VITAMIN D3) 25 MCG
27-0.8-228 TABLET,CHEWABLE ORAL
Qty: 90 | Refills: 2 | Status: ACTIVE | COMMUNITY
Start: 2023-05-22 | End: 1900-01-01

## 2023-06-05 ENCOUNTER — APPOINTMENT (OUTPATIENT)
Dept: OBGYN | Facility: CLINIC | Age: 33
End: 2023-06-05
Payer: COMMERCIAL

## 2023-06-05 VITALS
HEART RATE: 88 BPM | WEIGHT: 151 LBS | SYSTOLIC BLOOD PRESSURE: 101 MMHG | BODY MASS INDEX: 24.27 KG/M2 | OXYGEN SATURATION: 97 % | DIASTOLIC BLOOD PRESSURE: 67 MMHG

## 2023-06-05 PROCEDURE — 0502F SUBSEQUENT PRENATAL CARE: CPT

## 2023-06-15 ENCOUNTER — APPOINTMENT (OUTPATIENT)
Dept: ANTEPARTUM | Facility: CLINIC | Age: 33
End: 2023-06-15
Payer: COMMERCIAL

## 2023-06-15 ENCOUNTER — ASOB RESULT (OUTPATIENT)
Age: 33
End: 2023-06-15

## 2023-06-15 PROCEDURE — 76819 FETAL BIOPHYS PROFIL W/O NST: CPT

## 2023-06-15 PROCEDURE — 76816 OB US FOLLOW-UP PER FETUS: CPT

## 2023-06-21 ENCOUNTER — NON-APPOINTMENT (OUTPATIENT)
Age: 33
End: 2023-06-21

## 2023-06-22 ENCOUNTER — APPOINTMENT (OUTPATIENT)
Dept: OBGYN | Facility: CLINIC | Age: 33
End: 2023-06-22
Payer: COMMERCIAL

## 2023-06-22 VITALS
OXYGEN SATURATION: 98 % | BODY MASS INDEX: 24.91 KG/M2 | SYSTOLIC BLOOD PRESSURE: 109 MMHG | WEIGHT: 155 LBS | HEART RATE: 77 BPM | DIASTOLIC BLOOD PRESSURE: 73 MMHG

## 2023-06-22 PROCEDURE — 0502F SUBSEQUENT PRENATAL CARE: CPT

## 2023-06-29 ENCOUNTER — APPOINTMENT (OUTPATIENT)
Dept: ANTEPARTUM | Facility: CLINIC | Age: 33
End: 2023-06-29
Payer: COMMERCIAL

## 2023-06-29 ENCOUNTER — ASOB RESULT (OUTPATIENT)
Age: 33
End: 2023-06-29

## 2023-06-29 PROCEDURE — 76819 FETAL BIOPHYS PROFIL W/O NST: CPT

## 2023-06-29 PROCEDURE — 76816 OB US FOLLOW-UP PER FETUS: CPT

## 2023-07-03 ENCOUNTER — NON-APPOINTMENT (OUTPATIENT)
Age: 33
End: 2023-07-03

## 2023-07-03 ENCOUNTER — APPOINTMENT (OUTPATIENT)
Dept: OBGYN | Facility: CLINIC | Age: 33
End: 2023-07-03
Payer: COMMERCIAL

## 2023-07-03 VITALS
WEIGHT: 162.63 LBS | SYSTOLIC BLOOD PRESSURE: 108 MMHG | BODY MASS INDEX: 26.14 KG/M2 | DIASTOLIC BLOOD PRESSURE: 70 MMHG | OXYGEN SATURATION: 96 %

## 2023-07-03 PROCEDURE — 0502F SUBSEQUENT PRENATAL CARE: CPT

## 2023-07-03 RX ORDER — VALACYCLOVIR 500 MG/1
500 TABLET, FILM COATED ORAL TWICE DAILY
Qty: 60 | Refills: 1 | Status: ACTIVE | COMMUNITY
Start: 2023-07-03 | End: 1900-01-01

## 2023-07-06 LAB — B-HEM STREP SPEC QL CULT: NORMAL

## 2023-07-09 ENCOUNTER — OUTPATIENT (OUTPATIENT)
Dept: OUTPATIENT SERVICES | Facility: HOSPITAL | Age: 33
LOS: 1 days | End: 2023-07-09

## 2023-07-09 VITALS — WEIGHT: 154.1 LBS | HEIGHT: 66 IN

## 2023-07-09 DIAGNOSIS — Z3A.00 WEEKS OF GESTATION OF PREGNANCY NOT SPECIFIED: ICD-10-CM

## 2023-07-09 DIAGNOSIS — O26.899 OTHER SPECIFIED PREGNANCY RELATED CONDITIONS, UNSPECIFIED TRIMESTER: ICD-10-CM

## 2023-07-09 LAB
ALBUMIN SERPL ELPH-MCNC: 3.4 G/DL — SIGNIFICANT CHANGE UP (ref 3.3–5)
ALP SERPL-CCNC: 120 U/L — SIGNIFICANT CHANGE UP (ref 40–120)
ALT FLD-CCNC: 8 U/L — LOW (ref 10–45)
ANION GAP SERPL CALC-SCNC: 10 MMOL/L — SIGNIFICANT CHANGE UP (ref 5–17)
APPEARANCE UR: CLEAR — SIGNIFICANT CHANGE UP
AST SERPL-CCNC: 16 U/L — SIGNIFICANT CHANGE UP (ref 10–40)
BASOPHILS # BLD AUTO: 0.03 K/UL — SIGNIFICANT CHANGE UP (ref 0–0.2)
BASOPHILS NFR BLD AUTO: 0.2 % — SIGNIFICANT CHANGE UP (ref 0–2)
BILIRUB SERPL-MCNC: 0.3 MG/DL — SIGNIFICANT CHANGE UP (ref 0.2–1.2)
BILIRUB UR-MCNC: NEGATIVE — SIGNIFICANT CHANGE UP
BUN SERPL-MCNC: 7 MG/DL — SIGNIFICANT CHANGE UP (ref 7–23)
CALCIUM SERPL-MCNC: 8.8 MG/DL — SIGNIFICANT CHANGE UP (ref 8.4–10.5)
CHLORIDE SERPL-SCNC: 102 MMOL/L — SIGNIFICANT CHANGE UP (ref 96–108)
CO2 SERPL-SCNC: 22 MMOL/L — SIGNIFICANT CHANGE UP (ref 22–31)
COLOR SPEC: YELLOW — SIGNIFICANT CHANGE UP
CREAT ?TM UR-MCNC: 12 MG/DL — SIGNIFICANT CHANGE UP
CREAT SERPL-MCNC: 0.45 MG/DL — LOW (ref 0.5–1.3)
DIFF PNL FLD: NEGATIVE — SIGNIFICANT CHANGE UP
EGFR: 129 ML/MIN/1.73M2 — SIGNIFICANT CHANGE UP
EOSINOPHIL # BLD AUTO: 0 K/UL — SIGNIFICANT CHANGE UP (ref 0–0.5)
EOSINOPHIL NFR BLD AUTO: 0 % — SIGNIFICANT CHANGE UP (ref 0–6)
FIBRINOGEN PPP-MCNC: 635 MG/DL — HIGH (ref 200–445)
GLUCOSE SERPL-MCNC: 120 MG/DL — HIGH (ref 70–99)
GLUCOSE UR QL: NEGATIVE — SIGNIFICANT CHANGE UP
HCT VFR BLD CALC: 31.3 % — LOW (ref 34.5–45)
HGB BLD-MCNC: 10.6 G/DL — LOW (ref 11.5–15.5)
IMM GRANULOCYTES NFR BLD AUTO: 2.3 % — HIGH (ref 0–0.9)
KETONES UR-MCNC: NEGATIVE — SIGNIFICANT CHANGE UP
LDH SERPL L TO P-CCNC: 159 U/L — SIGNIFICANT CHANGE UP (ref 50–242)
LEUKOCYTE ESTERASE UR-ACNC: NEGATIVE — SIGNIFICANT CHANGE UP
LYMPHOCYTES # BLD AUTO: 1.74 K/UL — SIGNIFICANT CHANGE UP (ref 1–3.3)
LYMPHOCYTES # BLD AUTO: 14.3 % — SIGNIFICANT CHANGE UP (ref 13–44)
MCHC RBC-ENTMCNC: 30.9 PG — SIGNIFICANT CHANGE UP (ref 27–34)
MCHC RBC-ENTMCNC: 33.9 GM/DL — SIGNIFICANT CHANGE UP (ref 32–36)
MCV RBC AUTO: 91.3 FL — SIGNIFICANT CHANGE UP (ref 80–100)
MONOCYTES # BLD AUTO: 0.84 K/UL — SIGNIFICANT CHANGE UP (ref 0–0.9)
MONOCYTES NFR BLD AUTO: 6.9 % — SIGNIFICANT CHANGE UP (ref 2–14)
NEUTROPHILS # BLD AUTO: 9.31 K/UL — HIGH (ref 1.8–7.4)
NEUTROPHILS NFR BLD AUTO: 76.3 % — SIGNIFICANT CHANGE UP (ref 43–77)
NITRITE UR-MCNC: NEGATIVE — SIGNIFICANT CHANGE UP
NRBC # BLD: 0 /100 WBCS — SIGNIFICANT CHANGE UP (ref 0–0)
PH UR: 6 — SIGNIFICANT CHANGE UP (ref 5–8)
PLATELET # BLD AUTO: 154 K/UL — SIGNIFICANT CHANGE UP (ref 150–400)
POTASSIUM SERPL-MCNC: 3.6 MMOL/L — SIGNIFICANT CHANGE UP (ref 3.5–5.3)
POTASSIUM SERPL-SCNC: 3.6 MMOL/L — SIGNIFICANT CHANGE UP (ref 3.5–5.3)
PROT ?TM UR-MCNC: <4 MG/DL — SIGNIFICANT CHANGE UP (ref 0–12)
PROT SERPL-MCNC: 6.3 G/DL — SIGNIFICANT CHANGE UP (ref 6–8.3)
PROT UR-MCNC: NEGATIVE MG/DL — SIGNIFICANT CHANGE UP
PROT/CREAT UR-RTO: SIGNIFICANT CHANGE UP (ref 0–0.2)
RBC # BLD: 3.43 M/UL — LOW (ref 3.8–5.2)
RBC # FLD: 14.2 % — SIGNIFICANT CHANGE UP (ref 10.3–14.5)
SODIUM SERPL-SCNC: 134 MMOL/L — LOW (ref 135–145)
SP GR SPEC: <=1.005 — SIGNIFICANT CHANGE UP (ref 1–1.03)
URATE SERPL-MCNC: 3.9 MG/DL — SIGNIFICANT CHANGE UP (ref 2.5–7)
UROBILINOGEN FLD QL: 0.2 E.U./DL — SIGNIFICANT CHANGE UP
WBC # BLD: 12.2 K/UL — HIGH (ref 3.8–10.5)
WBC # FLD AUTO: 12.2 K/UL — HIGH (ref 3.8–10.5)

## 2023-07-09 PROCEDURE — 82570 ASSAY OF URINE CREATININE: CPT

## 2023-07-09 PROCEDURE — 83615 LACTATE (LD) (LDH) ENZYME: CPT

## 2023-07-09 PROCEDURE — 80053 COMPREHEN METABOLIC PANEL: CPT

## 2023-07-09 PROCEDURE — 81003 URINALYSIS AUTO W/O SCOPE: CPT

## 2023-07-09 PROCEDURE — 99214 OFFICE O/P EST MOD 30 MIN: CPT

## 2023-07-09 PROCEDURE — 85384 FIBRINOGEN ACTIVITY: CPT

## 2023-07-09 PROCEDURE — 84156 ASSAY OF PROTEIN URINE: CPT

## 2023-07-09 PROCEDURE — 85025 COMPLETE CBC W/AUTO DIFF WBC: CPT

## 2023-07-09 PROCEDURE — 36415 COLL VENOUS BLD VENIPUNCTURE: CPT

## 2023-07-09 PROCEDURE — 84550 ASSAY OF BLOOD/URIC ACID: CPT

## 2023-07-09 RX ORDER — SODIUM CHLORIDE 9 MG/ML
1000 INJECTION, SOLUTION INTRAVENOUS ONCE
Refills: 0 | Status: DISCONTINUED | OUTPATIENT
Start: 2023-07-09 | End: 2023-07-23

## 2023-07-09 RX ORDER — ACETAMINOPHEN 500 MG
1000 TABLET ORAL ONCE
Refills: 0 | Status: COMPLETED | OUTPATIENT
Start: 2023-07-09 | End: 2023-07-09

## 2023-07-09 RX ADMIN — Medication 400 MILLIGRAM(S): at 03:36

## 2023-07-10 ENCOUNTER — NON-APPOINTMENT (OUTPATIENT)
Age: 33
End: 2023-07-10

## 2023-07-10 ENCOUNTER — APPOINTMENT (OUTPATIENT)
Dept: OBGYN | Facility: CLINIC | Age: 33
End: 2023-07-10
Payer: COMMERCIAL

## 2023-07-10 VITALS
OXYGEN SATURATION: 97 % | WEIGHT: 157 LBS | BODY MASS INDEX: 25.23 KG/M2 | SYSTOLIC BLOOD PRESSURE: 102 MMHG | HEART RATE: 94 BPM | DIASTOLIC BLOOD PRESSURE: 69 MMHG

## 2023-07-10 PROCEDURE — 59025 FETAL NON-STRESS TEST: CPT

## 2023-07-10 PROCEDURE — 0502F SUBSEQUENT PRENATAL CARE: CPT | Mod: 25

## 2023-07-13 ENCOUNTER — APPOINTMENT (OUTPATIENT)
Dept: ANTEPARTUM | Facility: CLINIC | Age: 33
End: 2023-07-13
Payer: COMMERCIAL

## 2023-07-13 ENCOUNTER — ASOB RESULT (OUTPATIENT)
Age: 33
End: 2023-07-13

## 2023-07-13 PROCEDURE — 76816 OB US FOLLOW-UP PER FETUS: CPT

## 2023-07-13 PROCEDURE — 76819 FETAL BIOPHYS PROFIL W/O NST: CPT

## 2023-07-17 ENCOUNTER — APPOINTMENT (OUTPATIENT)
Dept: OBGYN | Facility: CLINIC | Age: 33
End: 2023-07-17
Payer: COMMERCIAL

## 2023-07-17 VITALS
WEIGHT: 159 LBS | DIASTOLIC BLOOD PRESSURE: 72 MMHG | BODY MASS INDEX: 25.55 KG/M2 | OXYGEN SATURATION: 96 % | HEART RATE: 95 BPM | SYSTOLIC BLOOD PRESSURE: 108 MMHG

## 2023-07-17 PROCEDURE — 0502F SUBSEQUENT PRENATAL CARE: CPT

## 2023-07-20 ENCOUNTER — APPOINTMENT (OUTPATIENT)
Dept: ANTEPARTUM | Facility: CLINIC | Age: 33
End: 2023-07-20
Payer: COMMERCIAL

## 2023-07-20 ENCOUNTER — ASOB RESULT (OUTPATIENT)
Age: 33
End: 2023-07-20

## 2023-07-20 PROCEDURE — 76815 OB US LIMITED FETUS(S): CPT

## 2023-07-20 PROCEDURE — 76818 FETAL BIOPHYS PROFILE W/NST: CPT

## 2023-07-22 ENCOUNTER — NON-APPOINTMENT (OUTPATIENT)
Age: 33
End: 2023-07-22

## 2023-07-23 ENCOUNTER — INPATIENT (INPATIENT)
Facility: HOSPITAL | Age: 33
LOS: 1 days | Discharge: HOME | End: 2023-07-25
Attending: OBSTETRICS & GYNECOLOGY | Admitting: OBSTETRICS & GYNECOLOGY
Payer: COMMERCIAL

## 2023-07-23 VITALS — WEIGHT: 158.73 LBS | HEIGHT: 64 IN

## 2023-07-23 LAB
BASOPHILS # BLD AUTO: 0.05 K/UL — SIGNIFICANT CHANGE UP (ref 0–0.2)
BASOPHILS NFR BLD AUTO: 0.3 % — SIGNIFICANT CHANGE UP (ref 0–2)
BLD GP AB SCN SERPL QL: NEGATIVE — SIGNIFICANT CHANGE UP
EOSINOPHIL # BLD AUTO: 0 K/UL — SIGNIFICANT CHANGE UP (ref 0–0.5)
EOSINOPHIL NFR BLD AUTO: 0 % — SIGNIFICANT CHANGE UP (ref 0–6)
HCT VFR BLD CALC: 33.7 % — LOW (ref 34.5–45)
HGB BLD-MCNC: 11.8 G/DL — SIGNIFICANT CHANGE UP (ref 11.5–15.5)
IMM GRANULOCYTES NFR BLD AUTO: 1.7 % — HIGH (ref 0–0.9)
LYMPHOCYTES # BLD AUTO: 14 % — SIGNIFICANT CHANGE UP (ref 13–44)
LYMPHOCYTES # BLD AUTO: 2.49 K/UL — SIGNIFICANT CHANGE UP (ref 1–3.3)
MCHC RBC-ENTMCNC: 31.5 PG — SIGNIFICANT CHANGE UP (ref 27–34)
MCHC RBC-ENTMCNC: 35 GM/DL — SIGNIFICANT CHANGE UP (ref 32–36)
MCV RBC AUTO: 89.9 FL — SIGNIFICANT CHANGE UP (ref 80–100)
MONOCYTES # BLD AUTO: 0.95 K/UL — HIGH (ref 0–0.9)
MONOCYTES NFR BLD AUTO: 5.3 % — SIGNIFICANT CHANGE UP (ref 2–14)
NEUTROPHILS # BLD AUTO: 14.03 K/UL — HIGH (ref 1.8–7.4)
NEUTROPHILS NFR BLD AUTO: 78.7 % — HIGH (ref 43–77)
NRBC # BLD: 0 /100 WBCS — SIGNIFICANT CHANGE UP (ref 0–0)
PLATELET # BLD AUTO: 184 K/UL — SIGNIFICANT CHANGE UP (ref 150–400)
RBC # BLD: 3.75 M/UL — LOW (ref 3.8–5.2)
RBC # FLD: 14.9 % — HIGH (ref 10.3–14.5)
RH IG SCN BLD-IMP: POSITIVE — SIGNIFICANT CHANGE UP
WBC # BLD: 17.82 K/UL — HIGH (ref 3.8–10.5)
WBC # FLD AUTO: 17.82 K/UL — HIGH (ref 3.8–10.5)

## 2023-07-23 PROCEDURE — 59400 OBSTETRICAL CARE: CPT

## 2023-07-23 RX ORDER — SODIUM CHLORIDE 9 MG/ML
3 INJECTION INTRAMUSCULAR; INTRAVENOUS; SUBCUTANEOUS EVERY 8 HOURS
Refills: 0 | Status: DISCONTINUED | OUTPATIENT
Start: 2023-07-23 | End: 2023-07-25

## 2023-07-23 RX ORDER — HYDROCORTISONE 1 %
1 OINTMENT (GRAM) TOPICAL EVERY 6 HOURS
Refills: 0 | Status: DISCONTINUED | OUTPATIENT
Start: 2023-07-23 | End: 2023-07-25

## 2023-07-23 RX ORDER — SIMETHICONE 80 MG/1
80 TABLET, CHEWABLE ORAL EVERY 4 HOURS
Refills: 0 | Status: DISCONTINUED | OUTPATIENT
Start: 2023-07-23 | End: 2023-07-25

## 2023-07-23 RX ORDER — BENZOCAINE 10 %
1 GEL (GRAM) MUCOUS MEMBRANE EVERY 6 HOURS
Refills: 0 | Status: DISCONTINUED | OUTPATIENT
Start: 2023-07-23 | End: 2023-07-25

## 2023-07-23 RX ORDER — SODIUM CHLORIDE 9 MG/ML
1000 INJECTION, SOLUTION INTRAVENOUS
Refills: 0 | Status: DISCONTINUED | OUTPATIENT
Start: 2023-07-23 | End: 2023-07-23

## 2023-07-23 RX ORDER — KETOROLAC TROMETHAMINE 30 MG/ML
30 SYRINGE (ML) INJECTION ONCE
Refills: 0 | Status: DISCONTINUED | OUTPATIENT
Start: 2023-07-23 | End: 2023-07-23

## 2023-07-23 RX ORDER — FENTANYL/BUPIVACAINE/NS/PF 2MCG/ML-.1
250 PLASTIC BAG, INJECTION (ML) INJECTION
Refills: 0 | Status: DISCONTINUED | OUTPATIENT
Start: 2023-07-23 | End: 2023-07-23

## 2023-07-23 RX ORDER — LANOLIN
1 OINTMENT (GRAM) TOPICAL EVERY 6 HOURS
Refills: 0 | Status: DISCONTINUED | OUTPATIENT
Start: 2023-07-23 | End: 2023-07-25

## 2023-07-23 RX ORDER — OXYCODONE HYDROCHLORIDE 5 MG/1
5 TABLET ORAL ONCE
Refills: 0 | Status: DISCONTINUED | OUTPATIENT
Start: 2023-07-23 | End: 2023-07-25

## 2023-07-23 RX ORDER — CHLORHEXIDINE GLUCONATE 213 G/1000ML
1 SOLUTION TOPICAL DAILY
Refills: 0 | Status: DISCONTINUED | OUTPATIENT
Start: 2023-07-23 | End: 2023-07-23

## 2023-07-23 RX ORDER — CITRIC ACID/SODIUM CITRATE 300-500 MG
15 SOLUTION, ORAL ORAL EVERY 6 HOURS
Refills: 0 | Status: DISCONTINUED | OUTPATIENT
Start: 2023-07-23 | End: 2023-07-23

## 2023-07-23 RX ORDER — IBUPROFEN 200 MG
600 TABLET ORAL EVERY 6 HOURS
Refills: 0 | Status: DISCONTINUED | OUTPATIENT
Start: 2023-07-23 | End: 2023-07-25

## 2023-07-23 RX ORDER — OXYTOCIN 10 UNIT/ML
41.67 VIAL (ML) INJECTION
Qty: 20 | Refills: 0 | Status: DISCONTINUED | OUTPATIENT
Start: 2023-07-23 | End: 2023-07-25

## 2023-07-23 RX ORDER — IBUPROFEN 200 MG
600 TABLET ORAL EVERY 6 HOURS
Refills: 0 | Status: COMPLETED | OUTPATIENT
Start: 2023-07-23 | End: 2024-06-20

## 2023-07-23 RX ORDER — DIBUCAINE 1 %
1 OINTMENT (GRAM) RECTAL EVERY 6 HOURS
Refills: 0 | Status: DISCONTINUED | OUTPATIENT
Start: 2023-07-23 | End: 2023-07-25

## 2023-07-23 RX ORDER — ACETAMINOPHEN 500 MG
975 TABLET ORAL
Refills: 0 | Status: DISCONTINUED | OUTPATIENT
Start: 2023-07-23 | End: 2023-07-25

## 2023-07-23 RX ORDER — PRAMOXINE HYDROCHLORIDE 150 MG/15G
1 AEROSOL, FOAM RECTAL EVERY 4 HOURS
Refills: 0 | Status: DISCONTINUED | OUTPATIENT
Start: 2023-07-23 | End: 2023-07-25

## 2023-07-23 RX ORDER — MAGNESIUM HYDROXIDE 400 MG/1
30 TABLET, CHEWABLE ORAL
Refills: 0 | Status: DISCONTINUED | OUTPATIENT
Start: 2023-07-23 | End: 2023-07-25

## 2023-07-23 RX ORDER — OXYTOCIN 10 UNIT/ML
333.33 VIAL (ML) INJECTION
Qty: 20 | Refills: 0 | Status: DISCONTINUED | OUTPATIENT
Start: 2023-07-23 | End: 2023-07-23

## 2023-07-23 RX ORDER — DIPHENHYDRAMINE HCL 50 MG
25 CAPSULE ORAL EVERY 6 HOURS
Refills: 0 | Status: DISCONTINUED | OUTPATIENT
Start: 2023-07-23 | End: 2023-07-25

## 2023-07-23 RX ORDER — TETANUS TOXOID, REDUCED DIPHTHERIA TOXOID AND ACELLULAR PERTUSSIS VACCINE, ADSORBED 5; 2.5; 8; 8; 2.5 [IU]/.5ML; [IU]/.5ML; UG/.5ML; UG/.5ML; UG/.5ML
0.5 SUSPENSION INTRAMUSCULAR ONCE
Refills: 0 | Status: DISCONTINUED | OUTPATIENT
Start: 2023-07-23 | End: 2023-07-25

## 2023-07-23 RX ORDER — OXYCODONE HYDROCHLORIDE 5 MG/1
5 TABLET ORAL
Refills: 0 | Status: DISCONTINUED | OUTPATIENT
Start: 2023-07-23 | End: 2023-07-25

## 2023-07-23 RX ORDER — AER TRAVELER 0.5 G/1
1 SOLUTION RECTAL; TOPICAL EVERY 4 HOURS
Refills: 0 | Status: DISCONTINUED | OUTPATIENT
Start: 2023-07-23 | End: 2023-07-25

## 2023-07-23 RX ADMIN — Medication 975 MILLIGRAM(S): at 18:10

## 2023-07-23 RX ADMIN — SODIUM CHLORIDE 125 MILLILITER(S): 9 INJECTION, SOLUTION INTRAVENOUS at 12:52

## 2023-07-23 RX ADMIN — SODIUM CHLORIDE 125 MILLILITER(S): 9 INJECTION, SOLUTION INTRAVENOUS at 13:00

## 2023-07-23 RX ADMIN — Medication 975 MILLIGRAM(S): at 23:31

## 2023-07-23 RX ADMIN — Medication 125 MILLIUNIT(S)/MIN: at 16:07

## 2023-07-23 RX ADMIN — Medication 30 MILLIGRAM(S): at 15:56

## 2023-07-23 RX ADMIN — Medication 600 MILLIGRAM(S): at 20:46

## 2023-07-23 RX ADMIN — Medication 975 MILLIGRAM(S): at 17:40

## 2023-07-23 RX ADMIN — Medication 30 MILLIGRAM(S): at 16:06

## 2023-07-23 RX ADMIN — Medication 250 MILLILITER(S): at 13:13

## 2023-07-23 NOTE — PRE-ANESTHESIA EVALUATION ADULT - NSANTHPMHFT_GEN_ALL_CORE
32 yo  at 39+3 presenting in early labor, painfully carlos and asking for an epidural. +FM. Denies VB/LOF

## 2023-07-24 ENCOUNTER — NON-APPOINTMENT (OUTPATIENT)
Age: 33
End: 2023-07-24

## 2023-07-24 ENCOUNTER — APPOINTMENT (OUTPATIENT)
Dept: OBGYN | Facility: CLINIC | Age: 33
End: 2023-07-24

## 2023-07-24 ENCOUNTER — TRANSCRIPTION ENCOUNTER (OUTPATIENT)
Age: 33
End: 2023-07-24

## 2023-07-24 LAB
COVID-19 SPIKE DOMAIN AB INTERP: POSITIVE
COVID-19 SPIKE DOMAIN ANTIBODY RESULT: >250 U/ML — HIGH
SARS-COV-2 IGG+IGM SERPL QL IA: >250 U/ML — HIGH
SARS-COV-2 IGG+IGM SERPL QL IA: POSITIVE
T PALLIDUM AB TITR SER: NEGATIVE — SIGNIFICANT CHANGE UP

## 2023-07-24 RX ORDER — IBUPROFEN 200 MG
1 TABLET ORAL
Qty: 0 | Refills: 0 | DISCHARGE
Start: 2023-07-24

## 2023-07-24 RX ORDER — BENZOCAINE 10 %
1 GEL (GRAM) MUCOUS MEMBRANE
Qty: 0 | Refills: 0 | DISCHARGE
Start: 2023-07-24

## 2023-07-24 RX ORDER — ACETAMINOPHEN 500 MG
3 TABLET ORAL
Qty: 0 | Refills: 0 | DISCHARGE
Start: 2023-07-24

## 2023-07-24 RX ADMIN — Medication 975 MILLIGRAM(S): at 18:35

## 2023-07-24 RX ADMIN — Medication 975 MILLIGRAM(S): at 13:03

## 2023-07-24 RX ADMIN — Medication 600 MILLIGRAM(S): at 15:22

## 2023-07-24 RX ADMIN — Medication 600 MILLIGRAM(S): at 05:00

## 2023-07-24 RX ADMIN — Medication 1 TABLET(S): at 13:02

## 2023-07-24 RX ADMIN — Medication 600 MILLIGRAM(S): at 04:16

## 2023-07-24 RX ADMIN — Medication 600 MILLIGRAM(S): at 21:22

## 2023-07-24 RX ADMIN — SODIUM CHLORIDE 3 MILLILITER(S): 9 INJECTION INTRAMUSCULAR; INTRAVENOUS; SUBCUTANEOUS at 21:54

## 2023-07-24 RX ADMIN — Medication 600 MILLIGRAM(S): at 22:15

## 2023-07-24 RX ADMIN — SODIUM CHLORIDE 3 MILLILITER(S): 9 INJECTION INTRAMUSCULAR; INTRAVENOUS; SUBCUTANEOUS at 14:00

## 2023-07-24 RX ADMIN — Medication 975 MILLIGRAM(S): at 06:32

## 2023-07-24 RX ADMIN — Medication 600 MILLIGRAM(S): at 09:10

## 2023-07-24 NOTE — DISCHARGE NOTE OB - PATIENT PORTAL LINK FT
You can access the FollowMyHealth Patient Portal offered by [facility] by registering at the following website: [email or web address]. By joining [employer]’s FollowMyHealth portal, you will also be able to view your health information using other applications (apps) compatible with our system.

## 2023-07-24 NOTE — DISCHARGE NOTE OB - CARE PROVIDER_API CALL
David Us  Obstetrics and Gynecology  [address], [facility] [place] [address]  Phone: [phone]  Fax: [phone]  Follow Up Time: 2 months

## 2023-07-24 NOTE — PROGRESS NOTE ADULT - ATTENDING COMMENTS
Patient seen and examined by me.  PPD#1, AFVSS, meeting postpartum milestones.  Pain well controlled and bleeding WNL.  Desires circumcision, consented and will do today. Continue routine PP care.

## 2023-07-25 VITALS
DIASTOLIC BLOOD PRESSURE: 70 MMHG | HEART RATE: 80 BPM | TEMPERATURE: 98 F | OXYGEN SATURATION: 97 % | SYSTOLIC BLOOD PRESSURE: 101 MMHG | RESPIRATION RATE: 17 BRPM

## 2023-07-25 PROCEDURE — 86780 TREPONEMA PALLIDUM: CPT

## 2023-07-25 PROCEDURE — 85025 COMPLETE CBC W/AUTO DIFF WBC: CPT

## 2023-07-25 PROCEDURE — 86769 SARS-COV-2 COVID-19 ANTIBODY: CPT

## 2023-07-25 PROCEDURE — 86901 BLOOD TYPING SEROLOGIC RH(D): CPT

## 2023-07-25 PROCEDURE — 86850 RBC ANTIBODY SCREEN: CPT

## 2023-07-25 PROCEDURE — 36415 COLL VENOUS BLD VENIPUNCTURE: CPT

## 2023-07-25 PROCEDURE — 86900 BLOOD TYPING SEROLOGIC ABO: CPT

## 2023-07-25 RX ADMIN — Medication 975 MILLIGRAM(S): at 12:01

## 2023-07-25 RX ADMIN — Medication 1 TABLET(S): at 12:01

## 2023-07-25 RX ADMIN — Medication 975 MILLIGRAM(S): at 01:25

## 2023-07-25 RX ADMIN — Medication 975 MILLIGRAM(S): at 00:14

## 2023-07-25 RX ADMIN — Medication 975 MILLIGRAM(S): at 12:40

## 2023-07-25 RX ADMIN — Medication 600 MILLIGRAM(S): at 09:22

## 2023-07-25 RX ADMIN — Medication 600 MILLIGRAM(S): at 10:07

## 2023-07-25 RX ADMIN — Medication 975 MILLIGRAM(S): at 06:14

## 2023-07-25 RX ADMIN — SODIUM CHLORIDE 3 MILLILITER(S): 9 INJECTION INTRAMUSCULAR; INTRAVENOUS; SUBCUTANEOUS at 05:51

## 2023-07-25 RX ADMIN — Medication 600 MILLIGRAM(S): at 03:20

## 2023-07-25 NOTE — PROGRESS NOTE ADULT - SUBJECTIVE AND OBJECTIVE BOX
Patient evaluated at bedside this morning, resting comfortable in bed, no acute events overnight.  She reports pain is well-controlled.  She denies headache, dizziness, chest pain, palpitations, shortness of breath, nausea, vomiting, fever, chills, heavy vaginal bleeding.  She has been ambulating without assistance, voiding spontaneously, tolerating food.      Physical Exam:  T(C): 36.4 (07-24-23 @ 22:10), Max: 36.6 (07-24-23 @ 19:38)  HR: 78 (07-24-23 @ 22:10) (78 - 84)  BP: 90/63 (07-24-23 @ 22:10) (90/63 - 97/57)  RR: 18 (07-24-23 @ 22:10) (18 - 18)  SpO2: 98% (07-24-23 @ 22:10) (97% - 98%)    Gen: no apparent distress  Pulm: no increased work of breathing  Abd: soft, nontender, nondistended, no rebound or guarding, uterus firm  Extremities: trace pedal edema bilaterally, no calf tenderness bilaterally                          11.8   17.82 )-----------( 184      ( 23 Jul 2023 12:02 )             33.7           acetaminophen     Tablet .. 975 milliGRAM(s) Oral <User Schedule>  benzocaine 20%/menthol 0.5% Spray 1 Spray(s) Topical every 6 hours PRN  dibucaine 1% Ointment 1 Application(s) Topical every 6 hours PRN  diphenhydrAMINE 25 milliGRAM(s) Oral every 6 hours PRN  diphtheria/tetanus/pertussis (acellular) Vaccine (Adacel) 0.5 milliLiter(s) IntraMuscular once  fentanyl (2 MICROgram(s)/mL) + bupivacaine 0.0625%  in 0.9% Sodium Chloride PCEA 250 milliLiter(s) Epidural PCA Continuous  hydrocortisone 1% Cream 1 Application(s) Topical every 6 hours PRN  ibuprofen  Tablet. 600 milliGRAM(s) Oral every 6 hours  lanolin Ointment 1 Application(s) Topical every 6 hours PRN  magnesium hydroxide Suspension 30 milliLiter(s) Oral two times a day PRN  oxyCODONE    IR 5 milliGRAM(s) Oral every 3 hours PRN  oxyCODONE    IR 5 milliGRAM(s) Oral once PRN  oxytocin Infusion 41.667 milliUNIT(s)/Min IV Continuous <Continuous>  pramoxine 1%/zinc 5% Cream 1 Application(s) Topical every 4 hours PRN  prenatal multivitamin 1 Tablet(s) Oral daily  simethicone 80 milliGRAM(s) Chew every 4 hours PRN  sodium chloride 0.9% lock flush 3 milliLiter(s) IV Push every 8 hours  witch hazel Pads 1 Application(s) Topical every 4 hours PRN  
Patient evaluated at bedside this morning, resting comfortable in bed, no acute events overnight.  She reports pain is well controlled with tylenol and motrin.  She denies heavy vaginal bleeding. She has been ambulating without assistance, voiding spontaneously.  Tolerating food well, without nausea/vomit.      Physical Exam:  T(C): 36.5 (07-24-23 @ 05:44), Max: 36.6 (07-23-23 @ 22:00)  HR: 90 (07-24-23 @ 05:44) (84 - 90)  BP: 91/51 (07-24-23 @ 05:44) (91/51 - 98/62)  RR: 18 (07-24-23 @ 05:44) (18 - 18)  SpO2: 97% (07-24-23 @ 05:44) (97% - 97%)    GA: lying comfortably in bed, NAD  Pulm: no increased work of breathing  Abd: soft, nontender, nondistended, no rebound or guarding, uterus firm.  Extremities: no swelling or calf tenderness                          11.8   17.82 )-----------( 184      ( 23 Jul 2023 12:02 )             33.7           acetaminophen     Tablet .. 975 milliGRAM(s) Oral <User Schedule>  benzocaine 20%/menthol 0.5% Spray 1 Spray(s) Topical every 6 hours PRN  dibucaine 1% Ointment 1 Application(s) Topical every 6 hours PRN  diphenhydrAMINE 25 milliGRAM(s) Oral every 6 hours PRN  diphtheria/tetanus/pertussis (acellular) Vaccine (Adacel) 0.5 milliLiter(s) IntraMuscular once  fentanyl (2 MICROgram(s)/mL) + bupivacaine 0.0625%  in 0.9% Sodium Chloride PCEA 250 milliLiter(s) Epidural PCA Continuous  hydrocortisone 1% Cream 1 Application(s) Topical every 6 hours PRN  ibuprofen  Tablet. 600 milliGRAM(s) Oral every 6 hours  lanolin Ointment 1 Application(s) Topical every 6 hours PRN  magnesium hydroxide Suspension 30 milliLiter(s) Oral two times a day PRN  oxyCODONE    IR 5 milliGRAM(s) Oral every 3 hours PRN  oxyCODONE    IR 5 milliGRAM(s) Oral once PRN  oxytocin Infusion 41.667 milliUNIT(s)/Min IV Continuous <Continuous>  pramoxine 1%/zinc 5% Cream 1 Application(s) Topical every 4 hours PRN  prenatal multivitamin 1 Tablet(s) Oral daily  simethicone 80 milliGRAM(s) Chew every 4 hours PRN  sodium chloride 0.9% lock flush 3 milliLiter(s) IV Push every 8 hours  witch hazel Pads 1 Application(s) Topical every 4 hours PRN

## 2023-07-25 NOTE — PROGRESS NOTE ADULT - ASSESSMENT
A/P 33y s/p , PPD1 , stable, meeting postpartum milestones   - Pain: well controlled on tylenol/motrin  - GI: Tolerating regular diet  - : urinating without difficulty/pain  - DVT prophylaxis: ambulating frequently  - Dispo: PPD 2, unless otherwise specified    
A/P  33y s/p , PPD# 2, stable, meeting postpartum milestones   - Pain: well controlled on tylenol/motrin  - GI: Tolerating regular diet  - : urinating without difficulty/pain  - DVT prophylaxis: ambulating frequently  - Dispo: PPD 2, unless otherwise specified

## 2023-07-27 ENCOUNTER — APPOINTMENT (OUTPATIENT)
Dept: ANTEPARTUM | Facility: CLINIC | Age: 33
End: 2023-07-27

## 2023-07-28 DIAGNOSIS — Z28.09 IMMUNIZATION NOT CARRIED OUT BECAUSE OF OTHER CONTRAINDICATION: ICD-10-CM

## 2023-07-28 DIAGNOSIS — B00.9 HERPESVIRAL INFECTION, UNSPECIFIED: ICD-10-CM

## 2023-07-28 DIAGNOSIS — Z3A.39 39 WEEKS GESTATION OF PREGNANCY: ICD-10-CM

## 2023-08-31 ENCOUNTER — APPOINTMENT (OUTPATIENT)
Dept: OBGYN | Facility: CLINIC | Age: 33
End: 2023-08-31
Payer: COMMERCIAL

## 2023-08-31 VITALS
DIASTOLIC BLOOD PRESSURE: 71 MMHG | BODY MASS INDEX: 23.48 KG/M2 | WEIGHT: 146.13 LBS | HEART RATE: 62 BPM | SYSTOLIC BLOOD PRESSURE: 104 MMHG | OXYGEN SATURATION: 96 %

## 2023-08-31 DIAGNOSIS — Z34.90 ENCOUNTER FOR SUPERVISION OF NORMAL PREGNANCY, UNSPECIFIED, UNSPECIFIED TRIMESTER: ICD-10-CM

## 2023-08-31 DIAGNOSIS — N92.6 IRREGULAR MENSTRUATION, UNSPECIFIED: ICD-10-CM

## 2023-08-31 PROCEDURE — 0503F POSTPARTUM CARE VISIT: CPT

## 2023-08-31 NOTE — HISTORY OF PRESENT ILLNESS
[Delivery Date: ___] : on [unfilled] [] : delivered by vaginal delivery [Male] : Delivery History: baby boy [Wt. ___] : weighing [unfilled] [Postpartum Follow Up] : postpartum follow up [Complications:___] : no complications [Breastfeeding] : not currently nursing [S/Sx PP Depression] : no signs/symptoms of postpartum depression [Erythema] : not erythematous [Back to Normal] : is back to normal in size [Mild] : mild vaginal bleeding [Normal] : the vagina was normal [Cervix Sample Taken] : cervical sample not taken for a Pap smear [Not Done] : Examination of breasts not done [Doing Well] : is doing well [No Sign of Infection] : is showing no signs of infection [Excellent Pain Control] : has excellent pain control [None] : None [FreeTextEntry8] : Follow up six weeks post-partum exam.

## 2023-09-06 LAB — CYTOLOGY CVX/VAG DOC THIN PREP: NORMAL

## 2025-06-11 ENCOUNTER — APPOINTMENT (OUTPATIENT)
Dept: OTOLARYNGOLOGY | Facility: CLINIC | Age: 35
End: 2025-06-11
Payer: COMMERCIAL

## 2025-06-11 ENCOUNTER — NON-APPOINTMENT (OUTPATIENT)
Age: 35
End: 2025-06-11

## 2025-06-11 VITALS
WEIGHT: 134.48 LBS | HEIGHT: 65 IN | SYSTOLIC BLOOD PRESSURE: 106 MMHG | OXYGEN SATURATION: 99 % | BODY MASS INDEX: 22.41 KG/M2 | HEART RATE: 64 BPM | DIASTOLIC BLOOD PRESSURE: 69 MMHG

## 2025-06-11 PROCEDURE — 31575 DIAGNOSTIC LARYNGOSCOPY: CPT

## 2025-06-11 PROCEDURE — 99204 OFFICE O/P NEW MOD 45 MIN: CPT | Mod: 25

## 2025-06-17 ENCOUNTER — APPOINTMENT (OUTPATIENT)
Dept: OTOLARYNGOLOGY | Facility: CLINIC | Age: 35
End: 2025-06-17

## 2025-06-17 ENCOUNTER — RESULT REVIEW (OUTPATIENT)
Age: 35
End: 2025-06-17

## 2025-06-17 VITALS
WEIGHT: 134 LBS | DIASTOLIC BLOOD PRESSURE: 63 MMHG | SYSTOLIC BLOOD PRESSURE: 93 MMHG | TEMPERATURE: 97.8 F | HEART RATE: 72 BPM | BODY MASS INDEX: 22.33 KG/M2 | HEIGHT: 65 IN | OXYGEN SATURATION: 97 %

## 2025-06-17 PROCEDURE — 99213 OFFICE O/P EST LOW 20 MIN: CPT | Mod: 25

## 2025-06-17 PROCEDURE — 76536 US EXAM OF HEAD AND NECK: CPT

## 2025-06-26 ENCOUNTER — APPOINTMENT (OUTPATIENT)
Dept: CT IMAGING | Facility: CLINIC | Age: 35
End: 2025-06-26
Payer: COMMERCIAL

## 2025-06-26 ENCOUNTER — OUTPATIENT (OUTPATIENT)
Dept: OUTPATIENT SERVICES | Facility: HOSPITAL | Age: 35
LOS: 1 days | End: 2025-06-26

## 2025-06-26 PROCEDURE — 70491 CT SOFT TISSUE NECK W/DYE: CPT | Mod: 26

## 2025-06-30 ENCOUNTER — APPOINTMENT (OUTPATIENT)
Dept: OTOLARYNGOLOGY | Facility: CLINIC | Age: 35
End: 2025-06-30
Payer: COMMERCIAL

## 2025-06-30 PROCEDURE — 99212 OFFICE O/P EST SF 10 MIN: CPT | Mod: 93
